# Patient Record
Sex: FEMALE | Race: WHITE | NOT HISPANIC OR LATINO | URBAN - METROPOLITAN AREA
[De-identification: names, ages, dates, MRNs, and addresses within clinical notes are randomized per-mention and may not be internally consistent; named-entity substitution may affect disease eponyms.]

---

## 2021-05-05 ENCOUNTER — APPOINTMENT (OUTPATIENT)
Dept: RADIOLOGY | Facility: MEDICAL CENTER | Age: 56
DRG: 004 | End: 2021-05-05
Attending: EMERGENCY MEDICINE
Payer: OTHER MISCELLANEOUS

## 2021-05-05 ENCOUNTER — APPOINTMENT (OUTPATIENT)
Dept: RADIOLOGY | Facility: MEDICAL CENTER | Age: 56
DRG: 004 | End: 2021-05-05
Attending: SURGERY
Payer: OTHER MISCELLANEOUS

## 2021-05-05 ENCOUNTER — HOSPITAL ENCOUNTER (INPATIENT)
Facility: MEDICAL CENTER | Age: 56
LOS: 7 days | DRG: 004 | End: 2021-05-12
Attending: EMERGENCY MEDICINE | Admitting: SURGERY
Payer: OTHER MISCELLANEOUS

## 2021-05-05 PROBLEM — S22.41XA CLOSED FRACTURE OF TWO RIBS OF RIGHT SIDE: Status: ACTIVE | Noted: 2021-05-05

## 2021-05-05 PROBLEM — Z11.9 SCREENING EXAMINATION FOR INFECTIOUS DISEASE: Status: ACTIVE | Noted: 2021-05-05

## 2021-05-05 PROBLEM — T50.905A PLATELET DYSFUNCTION DUE TO DRUGS: Status: ACTIVE | Noted: 2021-05-05

## 2021-05-05 PROBLEM — S02.92XA EXTENSIVE FACIAL FRACTURES (HCC): Status: ACTIVE | Noted: 2021-05-05

## 2021-05-05 PROBLEM — Z53.09 CONTRAINDICATION TO DEEP VEIN THROMBOSIS (DVT) PROPHYLAXIS: Status: ACTIVE | Noted: 2021-05-05

## 2021-05-05 PROBLEM — S06.36AA TRAUMATIC HEMORRHAGE OF CEREBRUM (HCC): Status: ACTIVE | Noted: 2021-05-05

## 2021-05-05 PROBLEM — T14.90XA TRAUMA: Status: ACTIVE | Noted: 2021-05-05

## 2021-05-05 PROBLEM — J96.90 RESPIRATORY FAILURE AFTER TRAUMA (HCC): Status: ACTIVE | Noted: 2021-05-05

## 2021-05-05 PROBLEM — D69.59 PLATELET DYSFUNCTION DUE TO DRUGS: Status: ACTIVE | Noted: 2021-05-05

## 2021-05-05 PROBLEM — S02.119A FRACTURE OF OCCIPITAL BONE (HCC): Status: ACTIVE | Noted: 2021-05-05

## 2021-05-05 LAB
ABO + RH BLD: NORMAL
ABO GROUP BLD: NORMAL
ALBUMIN SERPL BCP-MCNC: 2.7 G/DL (ref 3.2–4.9)
ALBUMIN/GLOB SERPL: 1.6 G/DL
ALP SERPL-CCNC: 47 U/L (ref 30–99)
ALT SERPL-CCNC: 32 U/L (ref 2–50)
ANION GAP SERPL CALC-SCNC: 8 MMOL/L (ref 7–16)
APTT PPP: 23.8 SEC (ref 24.7–36)
AST SERPL-CCNC: 44 U/L (ref 12–45)
BARCODED ABORH UBTYP: 6200
BARCODED PRD CODE UBPRD: NORMAL
BARCODED UNIT NUM UBUNT: NORMAL
BASE EXCESS BLDA CALC-SCNC: 0 MMOL/L (ref -4–3)
BILIRUB SERPL-MCNC: 0.2 MG/DL (ref 0.1–1.5)
BLD GP AB SCN SERPL QL: NORMAL
BODY TEMPERATURE: ABNORMAL DEGREES
BREATHS SETTING VENT: 24
BUN SERPL-MCNC: 7 MG/DL (ref 8–22)
CALCIUM SERPL-MCNC: 5.6 MG/DL (ref 8.5–10.5)
CFT BLD TEG: 3.2 MIN (ref 5–10)
CHLORIDE SERPL-SCNC: 118 MMOL/L (ref 96–112)
CLOT ANGLE BLD TEG: 67.6 DEGREES (ref 53–72)
CLOT LYSIS 30M P MA LENFR BLD TEG: 0 % (ref 0–8)
CO2 BLDA-SCNC: 27 MMOL/L (ref 20–33)
CO2 SERPL-SCNC: 17 MMOL/L (ref 20–33)
COMPONENT P 8504P: NORMAL
CREAT SERPL-MCNC: 0.35 MG/DL (ref 0.5–1.4)
CT.EXTRINSIC BLD ROTEM: 2.1 MIN (ref 1–3)
DELSYS IDSYS: ABNORMAL
END TIDAL CARBON DIOXIDE IECO2: 35 MMHG
ERYTHROCYTE [DISTWIDTH] IN BLOOD BY AUTOMATED COUNT: 43.2 FL (ref 35.9–50)
ETHANOL BLD-MCNC: <10.1 MG/DL (ref 0–10)
GLOBULIN SER CALC-MCNC: 1.7 G/DL (ref 1.9–3.5)
GLUCOSE SERPL-MCNC: 120 MG/DL (ref 65–99)
HCG SERPL QL: NEGATIVE
HCO3 BLDA-SCNC: 25.4 MMOL/L (ref 17–25)
HCT VFR BLD AUTO: 39.1 % (ref 37–47)
HGB BLD-MCNC: 13.3 G/DL (ref 12–16)
HOROWITZ INDEX BLDA+IHG-RTO: 450 MM[HG]
INR PPP: 1.08 (ref 0.87–1.13)
LACTATE BLD-SCNC: 2.6 MMOL/L (ref 0.5–2)
MCF BLD TEG: 59.2 MM (ref 50–70)
MCH RBC QN AUTO: 32.3 PG (ref 27–33)
MCHC RBC AUTO-ENTMCNC: 34 G/DL (ref 33.6–35)
MCV RBC AUTO: 94.9 FL (ref 81.4–97.8)
MODE IMODE: ABNORMAL
O2/TOTAL GAS SETTING VFR VENT: 100 %
PA AA BLD-ACNC: 90.2 %
PA ADP BLD-ACNC: 88.4 %
PCO2 BLDA: 43.1 MMHG (ref 26–37)
PCO2 TEMP ADJ BLDA: 41 MMHG (ref 26–37)
PEEP END EXPIRATORY PRESSURE IPEEP: 8 CMH20
PH BLDA: 7.38 [PH] (ref 7.4–7.5)
PH TEMP ADJ BLDA: 7.39 [PH] (ref 7.4–7.5)
PLATELET # BLD AUTO: 181 K/UL (ref 164–446)
PMV BLD AUTO: 9.8 FL (ref 9–12.9)
PO2 BLDA: 450 MMHG (ref 64–87)
PO2 TEMP ADJ BLDA: 443 MMHG (ref 64–87)
POTASSIUM SERPL-SCNC: 2.5 MMOL/L (ref 3.6–5.5)
PRODUCT TYPE UPROD: NORMAL
PROT SERPL-MCNC: 4.4 G/DL (ref 6–8.2)
PROTHROMBIN TIME: 14.4 SEC (ref 12–14.6)
RBC # BLD AUTO: 4.12 M/UL (ref 4.2–5.4)
RH BLD: NORMAL
SAO2 % BLDA: 100 % (ref 93–99)
SARS-COV+SARS-COV-2 AG RESP QL IA.RAPID: NOTDETECTED
SODIUM SERPL-SCNC: 143 MMOL/L (ref 135–145)
SPECIMEN DRAWN FROM PATIENT: ABNORMAL
SPECIMEN SOURCE: NORMAL
TEG ALGORITHM TGALG: ABNORMAL
TIDAL VOLUME IVT: 350 ML
TRIGL SERPL-MCNC: 65 MG/DL (ref 0–149)
UNIT STATUS USTAT: NORMAL
WBC # BLD AUTO: 12.8 K/UL (ref 4.8–10.8)

## 2021-05-05 PROCEDURE — C1751 CATH, INF, PER/CENT/MIDLINE: HCPCS

## 2021-05-05 PROCEDURE — 700111 HCHG RX REV CODE 636 W/ 250 OVERRIDE (IP): Performed by: EMERGENCY MEDICINE

## 2021-05-05 PROCEDURE — 85347 COAGULATION TIME ACTIVATED: CPT

## 2021-05-05 PROCEDURE — 94799 UNLISTED PULMONARY SVC/PX: CPT

## 2021-05-05 PROCEDURE — 70450 CT HEAD/BRAIN W/O DYE: CPT

## 2021-05-05 PROCEDURE — 99153 MOD SED SAME PHYS/QHP EA: CPT

## 2021-05-05 PROCEDURE — 02HV33Z INSERTION OF INFUSION DEVICE INTO SUPERIOR VENA CAVA, PERCUTANEOUS APPROACH: ICD-10-PCS | Performed by: SURGERY

## 2021-05-05 PROCEDURE — 72128 CT CHEST SPINE W/O DYE: CPT

## 2021-05-05 PROCEDURE — 5A1955Z RESPIRATORY VENTILATION, GREATER THAN 96 CONSECUTIVE HOURS: ICD-10-PCS | Performed by: EMERGENCY MEDICINE

## 2021-05-05 PROCEDURE — 85027 COMPLETE CBC AUTOMATED: CPT

## 2021-05-05 PROCEDURE — 36556 INSERT NON-TUNNEL CV CATH: CPT

## 2021-05-05 PROCEDURE — 85576 BLOOD PLATELET AGGREGATION: CPT

## 2021-05-05 PROCEDURE — 83605 ASSAY OF LACTIC ACID: CPT

## 2021-05-05 PROCEDURE — 85384 FIBRINOGEN ACTIVITY: CPT

## 2021-05-05 PROCEDURE — 37799 UNLISTED PX VASCULAR SURGERY: CPT

## 2021-05-05 PROCEDURE — 71260 CT THORAX DX C+: CPT

## 2021-05-05 PROCEDURE — 82077 ASSAY SPEC XCP UR&BREATH IA: CPT

## 2021-05-05 PROCEDURE — 36620 INSERTION CATHETER ARTERY: CPT

## 2021-05-05 PROCEDURE — G0390 TRAUMA RESPONS W/HOSP CRITI: HCPCS

## 2021-05-05 PROCEDURE — 72125 CT NECK SPINE W/O DYE: CPT

## 2021-05-05 PROCEDURE — 80053 COMPREHEN METABOLIC PANEL: CPT

## 2021-05-05 PROCEDURE — 86900 BLOOD TYPING SEROLOGIC ABO: CPT

## 2021-05-05 PROCEDURE — 700117 HCHG RX CONTRAST REV CODE 255: Performed by: EMERGENCY MEDICINE

## 2021-05-05 PROCEDURE — 94002 VENT MGMT INPAT INIT DAY: CPT

## 2021-05-05 PROCEDURE — 86850 RBC ANTIBODY SCREEN: CPT

## 2021-05-05 PROCEDURE — 85610 PROTHROMBIN TIME: CPT

## 2021-05-05 PROCEDURE — 84703 CHORIONIC GONADOTROPIN ASSAY: CPT

## 2021-05-05 PROCEDURE — 70486 CT MAXILLOFACIAL W/O DYE: CPT

## 2021-05-05 PROCEDURE — P9034 PLATELETS, PHERESIS: HCPCS

## 2021-05-05 PROCEDURE — 82803 BLOOD GASES ANY COMBINATION: CPT

## 2021-05-05 PROCEDURE — 700111 HCHG RX REV CODE 636 W/ 250 OVERRIDE (IP): Performed by: NEUROLOGICAL SURGERY

## 2021-05-05 PROCEDURE — 30233R1 TRANSFUSION OF NONAUTOLOGOUS PLATELETS INTO PERIPHERAL VEIN, PERCUTANEOUS APPROACH: ICD-10-PCS | Performed by: EMERGENCY MEDICINE

## 2021-05-05 PROCEDURE — 85730 THROMBOPLASTIN TIME PARTIAL: CPT

## 2021-05-05 PROCEDURE — U0003 INFECTIOUS AGENT DETECTION BY NUCLEIC ACID (DNA OR RNA); SEVERE ACUTE RESPIRATORY SYNDROME CORONAVIRUS 2 (SARS-COV-2) (CORONAVIRUS DISEASE [COVID-19]), AMPLIFIED PROBE TECHNIQUE, MAKING USE OF HIGH THROUGHPUT TECHNOLOGIES AS DESCRIBED BY CMS-2020-01-R: HCPCS

## 2021-05-05 PROCEDURE — 700111 HCHG RX REV CODE 636 W/ 250 OVERRIDE (IP): Performed by: SURGERY

## 2021-05-05 PROCEDURE — 96374 THER/PROPH/DIAG INJ IV PUSH: CPT

## 2021-05-05 PROCEDURE — 87426 SARSCOV CORONAVIRUS AG IA: CPT

## 2021-05-05 PROCEDURE — 71045 X-RAY EXAM CHEST 1 VIEW: CPT

## 2021-05-05 PROCEDURE — 36430 TRANSFUSION BLD/BLD COMPNT: CPT

## 2021-05-05 PROCEDURE — 4A103BD MONITORING OF INTRACRANIAL PRESSURE, PERCUTANEOUS APPROACH: ICD-10-PCS | Performed by: NEUROLOGICAL SURGERY

## 2021-05-05 PROCEDURE — 4A133B1 MONITORING OF ARTERIAL PRESSURE, PERIPHERAL, PERCUTANEOUS APPROACH: ICD-10-PCS | Performed by: SURGERY

## 2021-05-05 PROCEDURE — 72170 X-RAY EXAM OF PELVIS: CPT

## 2021-05-05 PROCEDURE — 84478 ASSAY OF TRIGLYCERIDES: CPT

## 2021-05-05 PROCEDURE — 700105 HCHG RX REV CODE 258: Performed by: SURGERY

## 2021-05-05 PROCEDURE — 86901 BLOOD TYPING SEROLOGIC RH(D): CPT

## 2021-05-05 PROCEDURE — U0005 INFEC AGEN DETEC AMPLI PROBE: HCPCS

## 2021-05-05 PROCEDURE — 0HQ1XZZ REPAIR FACE SKIN, EXTERNAL APPROACH: ICD-10-PCS | Performed by: SURGERY

## 2021-05-05 PROCEDURE — 99291 CRITICAL CARE FIRST HOUR: CPT

## 2021-05-05 PROCEDURE — 99152 MOD SED SAME PHYS/QHP 5/>YRS: CPT

## 2021-05-05 PROCEDURE — 770022 HCHG ROOM/CARE - ICU (200)

## 2021-05-05 PROCEDURE — 72131 CT LUMBAR SPINE W/O DYE: CPT

## 2021-05-05 RX ORDER — OXYCODONE HYDROCHLORIDE 5 MG/1
5 TABLET ORAL
Status: DISCONTINUED | OUTPATIENT
Start: 2021-05-05 | End: 2021-05-06

## 2021-05-05 RX ORDER — LEVETIRACETAM 5 MG/ML
500 INJECTION INTRAVASCULAR 2 TIMES DAILY
Status: DISCONTINUED | OUTPATIENT
Start: 2021-05-05 | End: 2021-05-06

## 2021-05-05 RX ORDER — OXYCODONE HYDROCHLORIDE 5 MG/1
10 TABLET ORAL
Status: DISCONTINUED | OUTPATIENT
Start: 2021-05-05 | End: 2021-05-06

## 2021-05-05 RX ORDER — POLYETHYLENE GLYCOL 3350 17 G/17G
1 POWDER, FOR SOLUTION ORAL 2 TIMES DAILY
Status: DISCONTINUED | OUTPATIENT
Start: 2021-05-06 | End: 2021-05-06

## 2021-05-05 RX ORDER — PROPOFOL 10 MG/ML
40 INJECTION, EMULSION INTRAVENOUS ONCE
Status: COMPLETED | OUTPATIENT
Start: 2021-05-05 | End: 2021-05-05

## 2021-05-05 RX ORDER — BISACODYL 10 MG
10 SUPPOSITORY, RECTAL RECTAL
Status: DISCONTINUED | OUTPATIENT
Start: 2021-05-05 | End: 2021-05-12 | Stop reason: HOSPADM

## 2021-05-05 RX ORDER — POTASSIUM CHLORIDE 29.8 MG/ML
40 INJECTION INTRAVENOUS ONCE
Status: COMPLETED | OUTPATIENT
Start: 2021-05-05 | End: 2021-05-06

## 2021-05-05 RX ORDER — HYDROMORPHONE HYDROCHLORIDE 1 MG/ML
0.5 INJECTION, SOLUTION INTRAMUSCULAR; INTRAVENOUS; SUBCUTANEOUS
Status: DISCONTINUED | OUTPATIENT
Start: 2021-05-05 | End: 2021-05-06

## 2021-05-05 RX ORDER — CEFAZOLIN SODIUM 1 G/50ML
1 INJECTION, SOLUTION INTRAVENOUS ONCE
Status: COMPLETED | OUTPATIENT
Start: 2021-05-05 | End: 2021-05-05

## 2021-05-05 RX ORDER — ACETAMINOPHEN 325 MG/1
650 TABLET ORAL EVERY 6 HOURS
Status: DISCONTINUED | OUTPATIENT
Start: 2021-05-05 | End: 2021-05-06

## 2021-05-05 RX ORDER — CEFAZOLIN SODIUM 1 G/50ML
1 INJECTION, SOLUTION INTRAVENOUS EVERY 8 HOURS
Status: DISCONTINUED | OUTPATIENT
Start: 2021-05-06 | End: 2021-05-08

## 2021-05-05 RX ORDER — DOCUSATE SODIUM 100 MG/1
100 CAPSULE, LIQUID FILLED ORAL 2 TIMES DAILY
Status: DISCONTINUED | OUTPATIENT
Start: 2021-05-06 | End: 2021-05-06

## 2021-05-05 RX ORDER — ONDANSETRON 2 MG/ML
4 INJECTION INTRAMUSCULAR; INTRAVENOUS EVERY 4 HOURS PRN
Status: DISCONTINUED | OUTPATIENT
Start: 2021-05-05 | End: 2021-05-12 | Stop reason: HOSPADM

## 2021-05-05 RX ORDER — FAMOTIDINE 20 MG/1
20 TABLET, FILM COATED ORAL 2 TIMES DAILY
Status: DISCONTINUED | OUTPATIENT
Start: 2021-05-06 | End: 2021-05-06

## 2021-05-05 RX ORDER — ENEMA 19; 7 G/133ML; G/133ML
1 ENEMA RECTAL
Status: DISCONTINUED | OUTPATIENT
Start: 2021-05-05 | End: 2021-05-12 | Stop reason: HOSPADM

## 2021-05-05 RX ORDER — LABETALOL HYDROCHLORIDE 5 MG/ML
10 INJECTION, SOLUTION INTRAVENOUS EVERY 4 HOURS PRN
Status: DISCONTINUED | OUTPATIENT
Start: 2021-05-05 | End: 2021-05-12 | Stop reason: HOSPADM

## 2021-05-05 RX ORDER — AMOXICILLIN 250 MG
1 CAPSULE ORAL
Status: DISCONTINUED | OUTPATIENT
Start: 2021-05-05 | End: 2021-05-06

## 2021-05-05 RX ORDER — ACETAMINOPHEN 325 MG/1
650 TABLET ORAL EVERY 6 HOURS PRN
Status: DISCONTINUED | OUTPATIENT
Start: 2021-05-10 | End: 2021-05-06

## 2021-05-05 RX ORDER — SODIUM CHLORIDE 9 MG/ML
INJECTION, SOLUTION INTRAVENOUS CONTINUOUS
Status: DISCONTINUED | OUTPATIENT
Start: 2021-05-05 | End: 2021-05-06

## 2021-05-05 RX ORDER — FAMOTIDINE 20 MG/1
20 TABLET, FILM COATED ORAL 2 TIMES DAILY
Status: DISCONTINUED | OUTPATIENT
Start: 2021-05-05 | End: 2021-05-05

## 2021-05-05 RX ORDER — HYDRALAZINE HYDROCHLORIDE 20 MG/ML
20 INJECTION INTRAMUSCULAR; INTRAVENOUS EVERY 4 HOURS PRN
Status: DISCONTINUED | OUTPATIENT
Start: 2021-05-05 | End: 2021-05-06

## 2021-05-05 RX ORDER — AMOXICILLIN 250 MG
1 CAPSULE ORAL NIGHTLY
Status: DISCONTINUED | OUTPATIENT
Start: 2021-05-05 | End: 2021-05-06

## 2021-05-05 RX ORDER — SODIUM CHLORIDE 9 MG/ML
1000 INJECTION, SOLUTION INTRAVENOUS ONCE
Status: COMPLETED | OUTPATIENT
Start: 2021-05-05 | End: 2021-05-06

## 2021-05-05 RX ADMIN — HYDROMORPHONE HYDROCHLORIDE 0.5 MG: 1 INJECTION, SOLUTION INTRAMUSCULAR; INTRAVENOUS; SUBCUTANEOUS at 21:16

## 2021-05-05 RX ADMIN — IOHEXOL 100 ML: 350 INJECTION, SOLUTION INTRAVENOUS at 21:00

## 2021-05-05 RX ADMIN — SODIUM CHLORIDE 1000 ML: 9 INJECTION, SOLUTION INTRAVENOUS at 23:00

## 2021-05-05 RX ADMIN — HYDROMORPHONE HYDROCHLORIDE 0.5 MG: 1 INJECTION, SOLUTION INTRAMUSCULAR; INTRAVENOUS; SUBCUTANEOUS at 23:05

## 2021-05-05 RX ADMIN — PROPOFOL 80 MCG/KG/MIN: 10 INJECTION, EMULSION INTRAVENOUS at 20:50

## 2021-05-05 RX ADMIN — PROPOFOL 40 MG: 10 INJECTION, EMULSION INTRAVENOUS at 21:45

## 2021-05-05 RX ADMIN — CEFAZOLIN SODIUM 1 G: 1 INJECTION, SOLUTION INTRAVENOUS at 21:42

## 2021-05-05 RX ADMIN — SODIUM CHLORIDE: 9 INJECTION, SOLUTION INTRAVENOUS at 20:51

## 2021-05-05 RX ADMIN — PROPOFOL 40 MCG/KG/MIN: 10 INJECTION, EMULSION INTRAVENOUS at 20:30

## 2021-05-05 RX ADMIN — POTASSIUM CHLORIDE 40 MEQ: 29.8 INJECTION, SOLUTION INTRAVENOUS at 22:49

## 2021-05-05 ASSESSMENT — PAIN DESCRIPTION - PAIN TYPE
TYPE: ACUTE PAIN

## 2021-05-05 ASSESSMENT — FIBROSIS 4 INDEX: FIB4 SCORE: 5.2

## 2021-05-06 ENCOUNTER — APPOINTMENT (OUTPATIENT)
Dept: RADIOLOGY | Facility: MEDICAL CENTER | Age: 56
DRG: 004 | End: 2021-05-06
Attending: SURGERY
Payer: OTHER MISCELLANEOUS

## 2021-05-06 ENCOUNTER — HOSPITAL ENCOUNTER (OUTPATIENT)
Dept: RADIOLOGY | Facility: MEDICAL CENTER | Age: 56
End: 2021-05-06
Attending: NEUROLOGICAL SURGERY

## 2021-05-06 ENCOUNTER — APPOINTMENT (OUTPATIENT)
Dept: RADIOLOGY | Facility: MEDICAL CENTER | Age: 56
DRG: 004 | End: 2021-05-06
Attending: NURSE PRACTITIONER
Payer: OTHER MISCELLANEOUS

## 2021-05-06 PROBLEM — S42.031A: Status: ACTIVE | Noted: 2021-05-06

## 2021-05-06 PROBLEM — S01.81XA FACIAL LACERATION: Status: ACTIVE | Noted: 2021-05-06

## 2021-05-06 LAB
ALBUMIN SERPL BCP-MCNC: 2.8 G/DL (ref 3.2–4.9)
ALBUMIN/GLOB SERPL: 1.5 G/DL
ALP SERPL-CCNC: 54 U/L (ref 30–99)
ALT SERPL-CCNC: 35 U/L (ref 2–50)
ANION GAP SERPL CALC-SCNC: 10 MMOL/L (ref 7–16)
AST SERPL-CCNC: 44 U/L (ref 12–45)
BASE EXCESS BLDA CALC-SCNC: -7 MMOL/L (ref -4–3)
BASOPHILS # BLD AUTO: 0.2 % (ref 0–1.8)
BASOPHILS # BLD: 0.02 K/UL (ref 0–0.12)
BILIRUB SERPL-MCNC: 0.5 MG/DL (ref 0.1–1.5)
BODY TEMPERATURE: ABNORMAL DEGREES
BREATHS SETTING VENT: 24
BUN SERPL-MCNC: 8 MG/DL (ref 8–22)
CALCIUM SERPL-MCNC: 6.6 MG/DL (ref 8.5–10.5)
CFT BLD TEG: 3.5 MIN (ref 5–10)
CHLORIDE SERPL-SCNC: 114 MMOL/L (ref 96–112)
CLOT ANGLE BLD TEG: 65.1 DEGREES (ref 53–72)
CLOT LYSIS 30M P MA LENFR BLD TEG: 0 % (ref 0–8)
CO2 BLDA-SCNC: 19 MMOL/L (ref 20–33)
CO2 SERPL-SCNC: 17 MMOL/L (ref 20–33)
CREAT SERPL-MCNC: 0.49 MG/DL (ref 0.5–1.4)
CT.EXTRINSIC BLD ROTEM: 2.2 MIN (ref 1–3)
DELSYS IDSYS: ABNORMAL
END TIDAL CARBON DIOXIDE IECO2: 34 MMHG
EOSINOPHIL # BLD AUTO: 0 K/UL (ref 0–0.51)
EOSINOPHIL NFR BLD: 0 % (ref 0–6.9)
ERYTHROCYTE [DISTWIDTH] IN BLOOD BY AUTOMATED COUNT: 43.8 FL (ref 35.9–50)
GLOBULIN SER CALC-MCNC: 1.9 G/DL (ref 1.9–3.5)
GLUCOSE SERPL-MCNC: 160 MG/DL (ref 65–99)
HCO3 BLDA-SCNC: 18 MMOL/L (ref 17–25)
HCT VFR BLD AUTO: 31.6 % (ref 37–47)
HGB BLD-MCNC: 10.8 G/DL (ref 12–16)
HOROWITZ INDEX BLDA+IHG-RTO: 420 MM[HG]
IMM GRANULOCYTES # BLD AUTO: 0.04 K/UL (ref 0–0.11)
IMM GRANULOCYTES NFR BLD AUTO: 0.3 % (ref 0–0.9)
LYMPHOCYTES # BLD AUTO: 0.51 K/UL (ref 1–4.8)
LYMPHOCYTES NFR BLD: 4.2 % (ref 22–41)
MAGNESIUM SERPL-MCNC: 1.4 MG/DL (ref 1.5–2.5)
MCF BLD TEG: 60.9 MM (ref 50–70)
MCH RBC QN AUTO: 32.6 PG (ref 27–33)
MCHC RBC AUTO-ENTMCNC: 34.2 G/DL (ref 33.6–35)
MCV RBC AUTO: 95.5 FL (ref 81.4–97.8)
MODE IMODE: ABNORMAL
MONOCYTES # BLD AUTO: 0.57 K/UL (ref 0–0.85)
MONOCYTES NFR BLD AUTO: 4.7 % (ref 0–13.4)
NEUTROPHILS # BLD AUTO: 11.1 K/UL (ref 2–7.15)
NEUTROPHILS NFR BLD: 90.6 % (ref 44–72)
NRBC # BLD AUTO: 0 K/UL
NRBC BLD-RTO: 0 /100 WBC
O2/TOTAL GAS SETTING VFR VENT: 30 %
PA AA BLD-ACNC: 0 %
PA ADP BLD-ACNC: 13.3 %
PCO2 BLDA: 31.5 MMHG (ref 26–37)
PCO2 TEMP ADJ BLDA: 32.2 MMHG (ref 26–37)
PEEP END EXPIRATORY PRESSURE IPEEP: 8 CMH20
PH BLDA: 7.37 [PH] (ref 7.4–7.5)
PH TEMP ADJ BLDA: 7.36 [PH] (ref 7.4–7.5)
PHOSPHATE SERPL-MCNC: 2.6 MG/DL (ref 2.5–4.5)
PLATELET # BLD AUTO: 201 K/UL (ref 164–446)
PMV BLD AUTO: 9.1 FL (ref 9–12.9)
PO2 BLDA: 126 MMHG (ref 64–87)
PO2 TEMP ADJ BLDA: 130 MMHG (ref 64–87)
POTASSIUM SERPL-SCNC: 3.1 MMOL/L (ref 3.6–5.5)
PROT SERPL-MCNC: 4.7 G/DL (ref 6–8.2)
RBC # BLD AUTO: 3.31 M/UL (ref 4.2–5.4)
SAO2 % BLDA: 99 % (ref 93–99)
SARS-COV-2 RNA RESP QL NAA+PROBE: NOTDETECTED
SODIUM SERPL-SCNC: 141 MMOL/L (ref 135–145)
SPECIMEN DRAWN FROM PATIENT: ABNORMAL
SPECIMEN SOURCE: NORMAL
TEG ALGORITHM TGALG: ABNORMAL
TIDAL VOLUME IVT: 350 ML
WBC # BLD AUTO: 12.2 K/UL (ref 4.8–10.8)

## 2021-05-06 PROCEDURE — 71045 X-RAY EXAM CHEST 1 VIEW: CPT

## 2021-05-06 PROCEDURE — 700105 HCHG RX REV CODE 258: Performed by: SURGERY

## 2021-05-06 PROCEDURE — 83735 ASSAY OF MAGNESIUM: CPT

## 2021-05-06 PROCEDURE — 700101 HCHG RX REV CODE 250: Performed by: NURSE PRACTITIONER

## 2021-05-06 PROCEDURE — 700111 HCHG RX REV CODE 636 W/ 250 OVERRIDE (IP): Performed by: NURSE PRACTITIONER

## 2021-05-06 PROCEDURE — 700111 HCHG RX REV CODE 636 W/ 250 OVERRIDE (IP): Performed by: SURGERY

## 2021-05-06 PROCEDURE — 99292 CRITICAL CARE ADDL 30 MIN: CPT | Performed by: SURGERY

## 2021-05-06 PROCEDURE — 85025 COMPLETE CBC W/AUTO DIFF WBC: CPT

## 2021-05-06 PROCEDURE — 37799 UNLISTED PX VASCULAR SURGERY: CPT

## 2021-05-06 PROCEDURE — 700101 HCHG RX REV CODE 250: Performed by: SURGERY

## 2021-05-06 PROCEDURE — 82803 BLOOD GASES ANY COMBINATION: CPT

## 2021-05-06 PROCEDURE — 85576 BLOOD PLATELET AGGREGATION: CPT

## 2021-05-06 PROCEDURE — 94003 VENT MGMT INPAT SUBQ DAY: CPT

## 2021-05-06 PROCEDURE — 94799 UNLISTED PULMONARY SVC/PX: CPT

## 2021-05-06 PROCEDURE — 93970 EXTREMITY STUDY: CPT

## 2021-05-06 PROCEDURE — 85384 FIBRINOGEN ACTIVITY: CPT

## 2021-05-06 PROCEDURE — 94760 N-INVAS EAR/PLS OXIMETRY 1: CPT

## 2021-05-06 PROCEDURE — 93970 EXTREMITY STUDY: CPT | Mod: 26 | Performed by: INTERNAL MEDICINE

## 2021-05-06 PROCEDURE — 770022 HCHG ROOM/CARE - ICU (200)

## 2021-05-06 PROCEDURE — A9270 NON-COVERED ITEM OR SERVICE: HCPCS | Performed by: SURGERY

## 2021-05-06 PROCEDURE — 99291 CRITICAL CARE FIRST HOUR: CPT | Performed by: SURGERY

## 2021-05-06 PROCEDURE — 80053 COMPREHEN METABOLIC PANEL: CPT

## 2021-05-06 PROCEDURE — 84100 ASSAY OF PHOSPHORUS: CPT

## 2021-05-06 PROCEDURE — 700111 HCHG RX REV CODE 636 W/ 250 OVERRIDE (IP): Performed by: NEUROLOGICAL SURGERY

## 2021-05-06 PROCEDURE — 700102 HCHG RX REV CODE 250 W/ 637 OVERRIDE(OP): Performed by: SURGERY

## 2021-05-06 PROCEDURE — 70450 CT HEAD/BRAIN W/O DYE: CPT

## 2021-05-06 PROCEDURE — 85347 COAGULATION TIME ACTIVATED: CPT

## 2021-05-06 RX ORDER — HYDRALAZINE HYDROCHLORIDE 20 MG/ML
20 INJECTION INTRAMUSCULAR; INTRAVENOUS EVERY 4 HOURS PRN
Status: DISCONTINUED | OUTPATIENT
Start: 2021-05-06 | End: 2021-05-12 | Stop reason: HOSPADM

## 2021-05-06 RX ORDER — AMOXICILLIN 250 MG
1 CAPSULE ORAL NIGHTLY
Status: DISCONTINUED | OUTPATIENT
Start: 2021-05-06 | End: 2021-05-12 | Stop reason: HOSPADM

## 2021-05-06 RX ORDER — SODIUM CHLORIDE AND POTASSIUM CHLORIDE 300; 900 MG/100ML; MG/100ML
INJECTION, SOLUTION INTRAVENOUS CONTINUOUS
Status: DISCONTINUED | OUTPATIENT
Start: 2021-05-06 | End: 2021-05-08

## 2021-05-06 RX ORDER — DOCUSATE SODIUM 50 MG/5ML
100 LIQUID ORAL 2 TIMES DAILY
Status: DISCONTINUED | OUTPATIENT
Start: 2021-05-06 | End: 2021-05-12 | Stop reason: HOSPADM

## 2021-05-06 RX ORDER — MAGNESIUM SULFATE HEPTAHYDRATE 40 MG/ML
2 INJECTION, SOLUTION INTRAVENOUS ONCE
Status: COMPLETED | OUTPATIENT
Start: 2021-05-06 | End: 2021-05-06

## 2021-05-06 RX ORDER — AMOXICILLIN 250 MG
1 CAPSULE ORAL
Status: DISCONTINUED | OUTPATIENT
Start: 2021-05-06 | End: 2021-05-12 | Stop reason: HOSPADM

## 2021-05-06 RX ORDER — LEVETIRACETAM 500 MG/1
500 TABLET ORAL 2 TIMES DAILY
Status: COMPLETED | OUTPATIENT
Start: 2021-05-06 | End: 2021-05-12

## 2021-05-06 RX ORDER — ENALAPRILAT 1.25 MG/ML
1.25 INJECTION INTRAVENOUS EVERY 6 HOURS PRN
Status: DISCONTINUED | OUTPATIENT
Start: 2021-05-06 | End: 2021-05-12 | Stop reason: HOSPADM

## 2021-05-06 RX ORDER — OXYCODONE HYDROCHLORIDE 5 MG/1
5 TABLET ORAL
Status: DISCONTINUED | OUTPATIENT
Start: 2021-05-06 | End: 2021-05-10

## 2021-05-06 RX ORDER — FAMOTIDINE 20 MG/1
20 TABLET, FILM COATED ORAL 2 TIMES DAILY
Status: DISCONTINUED | OUTPATIENT
Start: 2021-05-06 | End: 2021-05-12 | Stop reason: HOSPADM

## 2021-05-06 RX ORDER — AMLODIPINE BESYLATE 10 MG/1
10 TABLET ORAL
Status: DISCONTINUED | OUTPATIENT
Start: 2021-05-06 | End: 2021-05-12 | Stop reason: HOSPADM

## 2021-05-06 RX ORDER — ACETAMINOPHEN 325 MG/1
650 TABLET ORAL EVERY 6 HOURS PRN
Status: DISCONTINUED | OUTPATIENT
Start: 2021-05-10 | End: 2021-05-10

## 2021-05-06 RX ORDER — HYDROMORPHONE HYDROCHLORIDE 1 MG/ML
0.5 INJECTION, SOLUTION INTRAMUSCULAR; INTRAVENOUS; SUBCUTANEOUS
Status: DISCONTINUED | OUTPATIENT
Start: 2021-05-06 | End: 2021-05-10

## 2021-05-06 RX ORDER — CALCIUM GLUCONATE 94 MG/ML
1 INJECTION, SOLUTION INTRAVENOUS ONCE
Status: DISCONTINUED | OUTPATIENT
Start: 2021-05-06 | End: 2021-05-06

## 2021-05-06 RX ORDER — OXYCODONE HYDROCHLORIDE 5 MG/1
10 TABLET ORAL
Status: DISCONTINUED | OUTPATIENT
Start: 2021-05-06 | End: 2021-05-10

## 2021-05-06 RX ORDER — ACETAMINOPHEN 325 MG/1
650 TABLET ORAL EVERY 6 HOURS
Status: COMPLETED | OUTPATIENT
Start: 2021-05-06 | End: 2021-05-10

## 2021-05-06 RX ORDER — POLYETHYLENE GLYCOL 3350 17 G/17G
1 POWDER, FOR SOLUTION ORAL 2 TIMES DAILY
Status: DISCONTINUED | OUTPATIENT
Start: 2021-05-06 | End: 2021-05-12 | Stop reason: HOSPADM

## 2021-05-06 RX ADMIN — HYDROMORPHONE HYDROCHLORIDE 0.5 MG: 1 INJECTION, SOLUTION INTRAMUSCULAR; INTRAVENOUS; SUBCUTANEOUS at 07:27

## 2021-05-06 RX ADMIN — LABETALOL HYDROCHLORIDE 10 MG: 5 INJECTION, SOLUTION INTRAVENOUS at 05:46

## 2021-05-06 RX ADMIN — CEFAZOLIN SODIUM 1 G: 1 INJECTION, SOLUTION INTRAVENOUS at 20:28

## 2021-05-06 RX ADMIN — HYDROMORPHONE HYDROCHLORIDE 0.5 MG: 1 INJECTION, SOLUTION INTRAMUSCULAR; INTRAVENOUS; SUBCUTANEOUS at 14:26

## 2021-05-06 RX ADMIN — OXYCODONE 10 MG: 5 TABLET ORAL at 13:32

## 2021-05-06 RX ADMIN — HYDROMORPHONE HYDROCHLORIDE 0.5 MG: 1 INJECTION, SOLUTION INTRAMUSCULAR; INTRAVENOUS; SUBCUTANEOUS at 10:07

## 2021-05-06 RX ADMIN — HYDROMORPHONE HYDROCHLORIDE 0.5 MG: 1 INJECTION, SOLUTION INTRAMUSCULAR; INTRAVENOUS; SUBCUTANEOUS at 17:11

## 2021-05-06 RX ADMIN — ACETAMINOPHEN 650 MG: 325 TABLET, FILM COATED ORAL at 11:38

## 2021-05-06 RX ADMIN — HYDRALAZINE HYDROCHLORIDE 20 MG: 20 INJECTION INTRAMUSCULAR; INTRAVENOUS at 04:13

## 2021-05-06 RX ADMIN — POTASSIUM CHLORIDE AND SODIUM CHLORIDE: 900; 300 INJECTION, SOLUTION INTRAVENOUS at 07:31

## 2021-05-06 RX ADMIN — POTASSIUM CHLORIDE AND SODIUM CHLORIDE: 900; 300 INJECTION, SOLUTION INTRAVENOUS at 20:55

## 2021-05-06 RX ADMIN — LEVETIRACETAM INJECTION 500 MG: 5 INJECTION INTRAVENOUS at 00:11

## 2021-05-06 RX ADMIN — CALCIUM GLUCONATE 1 G: 98 INJECTION, SOLUTION INTRAVENOUS at 08:01

## 2021-05-06 RX ADMIN — LEVETIRACETAM INJECTION 500 MG: 5 INJECTION INTRAVENOUS at 05:35

## 2021-05-06 RX ADMIN — DOCUSATE SODIUM 100 MG: 50 LIQUID ORAL at 17:10

## 2021-05-06 RX ADMIN — HYDROMORPHONE HYDROCHLORIDE 0.5 MG: 1 INJECTION, SOLUTION INTRAMUSCULAR; INTRAVENOUS; SUBCUTANEOUS at 22:33

## 2021-05-06 RX ADMIN — CEFAZOLIN SODIUM 1 G: 1 INJECTION, SOLUTION INTRAVENOUS at 13:42

## 2021-05-06 RX ADMIN — SODIUM CHLORIDE: 9 INJECTION, SOLUTION INTRAVENOUS at 03:39

## 2021-05-06 RX ADMIN — HYDROMORPHONE HYDROCHLORIDE 0.5 MG: 1 INJECTION, SOLUTION INTRAMUSCULAR; INTRAVENOUS; SUBCUTANEOUS at 04:21

## 2021-05-06 RX ADMIN — AMLODIPINE BESYLATE 10 MG: 10 TABLET ORAL at 13:47

## 2021-05-06 RX ADMIN — HYDROMORPHONE HYDROCHLORIDE 0.5 MG: 1 INJECTION, SOLUTION INTRAMUSCULAR; INTRAVENOUS; SUBCUTANEOUS at 19:22

## 2021-05-06 RX ADMIN — ACETAMINOPHEN 650 MG: 325 TABLET, FILM COATED ORAL at 17:10

## 2021-05-06 RX ADMIN — LEVETIRACETAM 500 MG: 500 TABLET ORAL at 17:11

## 2021-05-06 RX ADMIN — FAMOTIDINE 20 MG: 20 TABLET ORAL at 17:10

## 2021-05-06 RX ADMIN — HYDROMORPHONE HYDROCHLORIDE 0.5 MG: 1 INJECTION, SOLUTION INTRAMUSCULAR; INTRAVENOUS; SUBCUTANEOUS at 11:57

## 2021-05-06 RX ADMIN — FAMOTIDINE 20 MG: 10 INJECTION INTRAVENOUS at 05:34

## 2021-05-06 RX ADMIN — CALCIUM GLUCONATE 1 G: 98 INJECTION, SOLUTION INTRAVENOUS at 02:44

## 2021-05-06 RX ADMIN — HYDROMORPHONE HYDROCHLORIDE 0.5 MG: 1 INJECTION, SOLUTION INTRAMUSCULAR; INTRAVENOUS; SUBCUTANEOUS at 16:05

## 2021-05-06 RX ADMIN — DOCUSATE SODIUM 50 MG AND SENNOSIDES 8.6 MG 1 TABLET: 8.6; 5 TABLET, FILM COATED ORAL at 20:28

## 2021-05-06 RX ADMIN — MAGNESIUM SULFATE 2 G: 2 INJECTION INTRAVENOUS at 07:32

## 2021-05-06 RX ADMIN — ENALAPRILAT 1.25 MG: 1.25 INJECTION INTRAVENOUS at 14:30

## 2021-05-06 RX ADMIN — LABETALOL HYDROCHLORIDE 10 MG: 5 INJECTION, SOLUTION INTRAVENOUS at 13:53

## 2021-05-06 RX ADMIN — HYDRALAZINE HYDROCHLORIDE 20 MG: 20 INJECTION INTRAMUSCULAR; INTRAVENOUS at 11:39

## 2021-05-06 RX ADMIN — CEFAZOLIN SODIUM 1 G: 1 INJECTION, SOLUTION INTRAVENOUS at 05:34

## 2021-05-06 RX ADMIN — LABETALOL HYDROCHLORIDE 10 MG: 5 INJECTION, SOLUTION INTRAVENOUS at 21:01

## 2021-05-06 ASSESSMENT — FIBROSIS 4 INDEX: FIB4 SCORE: 2.07

## 2021-05-06 ASSESSMENT — PAIN DESCRIPTION - PAIN TYPE
TYPE: ACUTE PAIN
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
## 2021-05-06 NOTE — PROGRESS NOTES
2 RN skin check with Martha RN     -Right shoulder puncture wound- 3 staples  -abrasion to right arm  -abrasion ro right hip  abrasion to right waist  -abrasion to right leg and knee  -abrasion to right hand   -facial laceration to right eyebrow, sutured   -abrasion to nose  -bilateral eyes swollen and blue/black   -Left knee abrasion
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
## 2021-05-06 NOTE — PROCEDURES
Procedure Report    Date of Procedure: 5/5/2021    Procedure: Arterial line placement    Surgeon: Deandre Sierra MD    Diagnosis: Trauma, severe TBI    Indications: Cerebral perfusion pressure measurement     Procedure in detail: The area was sterilely prepped and draped. The finder needle was used to locate the radial artery. Once a flash of arterial blood was obtained, a left radial arterial line was placed using the modified seldinger technique.  The catheter was secured to the skin with silk suture and a pressure line was attached to the catheter with good waveform on the monitor.  Sterile dressings were applied. The patient tolerated the procedure well.      Deandre Sierra MD  [facility]  [phone]

## 2021-05-06 NOTE — OR SURGEON
Immediate Post OP Note    PreOp Diagnosis: traumatic brain injury, cerebral contusions, coma, r/o elevated intracranial pressure      PostOp Diagnosis:  traumatic brain injury, cerebral contusions, coma      R frontal ICP monitor placement    Anesthesiologist/Type of Anesthesia:  Anesthesia staff cannot be found from this context./* No surgery found *    Surgical Staff:  FERNANDEZ Mcknight MD    Specimens removed if any:  * Cannot find log *    Estimated Blood Loss: scant    Findings: ICP 12 flat, good waveform    Complications: none    Repeat head CT 4 hours, will follow, sedation prn, Q 1 hr neuro checks        5/5/2021 9:43 PM Elmer Mcknight III, M.D.

## 2021-05-06 NOTE — PROCEDURES
Procedure Report    Date of Procedure: 5/5/2021    Procedure: Central line placement    Surgeon: Deandre Sierra MD    Diagnosis: Trauma, severe TBI    Indications: infusion of hypertonic fluids, need for access    Procedure in detail: Full barrier precautions were used for the procedure including cap, sterile gown, sterile gloves, and sterile full body drape. The patient was placed in the trendelenburg position. The patients right superior anterior chest wall was prepped and draped in the standard sterile fashion   The right subclavian vein was accessed with a needle. The modified seldinger technique was used to place a 7Fr 20cm triple lumen catheter. The catheter was secured to the skin with silk suture.   Sterile dressings were applied, including a biopatch. The patient tolerated the procedure well.  A chest x-ray was ordered for verification.       Deandre Sierra MD  [facility]  [phone]

## 2021-05-06 NOTE — ASSESSMENT & PLAN NOTE
Extensive areas of intraparenchymal hemorrhage consistent with hemorrhagic contusions, and the left temporal and right frontal lobes.  Intracranial monitoring placed in ICU  Hypertonic saline bolus received in ED, not continued per neurosurgery  Follow up head CT with worsening bilateral subarachnoid hemorrhages.  Non-operative management.   Neuro check  Targeting normal serum sodium  Post traumatic pharmacologic seizure prophylaxis for 1 week.  5/8 ICP monitor removed  5/10 MRI: Extensive contusions, shear injury sparing the midbrain, consistent with diffuse axonal injury  Expected long hospital course with still uncertain prognosis  Discussed at length with family, full care for now  Elmer Mcknight MD. Neurosurgeon. [facility].
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
## 2021-05-06 NOTE — PROGRESS NOTES
Cortrak Placement    Tube Team verified patient name and medical record number prior to tube placement.  Cortrak tube (55 inches, 10 Azerbaijani) placed at 70 cm in left nare.  Per Cortrak picture, tube appears to be in the stomach.  Nursing Instructions: Awaiting KUB to confirm placement before use for medications or feeding. Once placement confirmed, flush tube with 30 ml of water, and then remove and save stylet, in patient medication drawer.

## 2021-05-06 NOTE — RESPIRATORY CARE
Ventilator Daily Summary    Vent Day #2    Ventilator settings changed this shift:None    Weaning trials:No     Respiratory Procedures:None    Plan: Continue current ventilator settings and wean mechanical ventilation as tolerated per physician orders.

## 2021-05-06 NOTE — ASSESSMENT & PLAN NOTE
Comminuted fractures of the distal shaft of the right clavicle.  Discussed with orthopedics: They plan to perform ORIF on clavicle  Procedure is yet to be scheduled  Weight bearing status - Nonweightbearing NANCIE.  Vicente Blair MD. Orthopedic Surgeon. [facility].
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
## 2021-05-06 NOTE — ASSESSMENT & PLAN NOTE
Right occipital bone fractures extending into the right petrous ridge.  Non-operative management.  Elmer Mcknight MD. Neurosurgeon. [facility].

## 2021-05-06 NOTE — PROGRESS NOTES
TRAUMA TERTIARY SURVEY     Mental status adequate for full examination?: No    Spine cleared (radiologically and/or clinically): Yes    PHYSICAL EXAMINATION:  Vitals: /59   Pulse 80   Temp 37.7 °C (99.8 °F) (Bladder)   Resp (!) 9   Wt 61.4 kg (135 lb 5.8 oz)   SpO2 99%   Breastfeeding No   Constitutional:     General Appearance: vented with ICP and facial edema.  HEENT:    vented and sedated, unable to access.. unable to get accurate assessment, on sedation,vented.. The extraocular muscles are intact bilaterally. vented, sedated unable to access... The nares and oropharynx are partially obscured by blood and secretions. The midface and jaw are stable. vented, unable to have pt participate..  Neck:    The cervical spine is immobilized with a hard collar.  Respiratory:   Inspection: Mechanically ventilated.   Palpation:  vented.    Auscultation: vented with full support.  Cardiovascular:   Auscultation: normal.   Peripheral Pulses: Normal.   Abdomen:   Abdomen is soft, nontender, without organomegaly or masses.  Genitourinary:   (FEMALE): .  Musculoskeletal:   The pelvis is stable.  No significant angulation, deformity, or soft tissue injury involving the upper and lower extremities. Normal range of motion.   Back:   Pt is vented and sedated, nursing reports no abrasions or deformities on back.  Skin:   The skin is warm.  Neurologic:    [place] Coma Scale (GCS) 3T E1V1M1. Neurologic examination revealed unable to access, vented and sedated.  Psychiatric:   The patient unable to access while pt on vent and sedated.    IMAGING:  DX-ABDOMEN FOR TUBE PLACEMENT   Final Result      Enteric tube tip projects over the stomach.      US-TRAUMA VEIN SCREEN LOWER BILAT EXTREMITY   Final Result      DX-CHEST-PORTABLE (1 VIEW)   Final Result      No acute cardiopulmonary abnormality. No interval change.      CT-HEAD W/O   Final Result      1.  Stable right frontal and left temporal intraparenchymal hemorrhages.   2.   Increasing bilateral subarachnoid hemorrhage, especially on the left.               INTERPRETING LOCATION:  [address] , [place], [address]      DX-CHEST-FOR LINE PLACEMENT Perform procedure in: Patient's Room   Final Result      Right central venous line in place, with no pneumothorax.      CT-CHEST,ABDOMEN,PELVIS WITH   Final Result      1.  Right second and third rib fractures. No other thoracic abnormality.   2.  Unremarkable CT scans of the abdomen and pelvis.      CT-LSPINE W/O PLUS RECONS   Final Result      No evidence of fracture or traumatic subluxation.      CT-TSPINE W/O PLUS RECONS   Final Result   Impression: No evidence of thoracic spine fracture or traumatic malalignment. Right second rib fracture.      CT-CSPINE WITHOUT PLUS RECONS   Final Result      1.  No acute cervical spine fracture or traumatic malalignment.   2.  Right occipital bone fractures extending into the right petrous ridge. These findings were discussed with TIMOTHY FREIRE on 5/5/2021 at 2105 hours.      CT-HEAD W/O   Final Result      1.  Extensive areas of intraparenchymal hemorrhage consistent with hemorrhagic contusions, and the left temporal and right frontal lobes.   2.  Right skull base/orbital fractures as well as left orbital roof fractures.   These findings were discussed with TIMOTHY FREIRE on 5/5/2021 at 2105 hours.            INTERPRETING LOCATION:  [address] [facility], [address] .      CT-MAXILLOFACIAL W/O PLUS RECONS   Final Result      1.  Fractures of the medial, lateral, and roof of the right orbit. The orbital fractures extend to the right squamous temporal bone.   2.  Fractures of the left orbital roof with extension to the left squamous temporal bone.      DX-CHEST-LIMITED (1 VIEW)   Final Result      1.  No acute cardiopulmonary abnormality.   2.  Fractures of the right clavicle and right second and third ribs.               INTERPRETING LOCATION: [address] , [place], [address]      DX-PELVIS-1 OR 2 VIEWS   Final  Result      No acute bony abnormality.      US-ABORTED US PROCEDURE    (Results Pending)     All current laboratory studies/radiology exams reviewed: Yes    Completed Consultations:  Dr. GERONIMO Mcknight - Neurosurgery     Pending Consultations:  No current consults pending    Newly Identified Diagnoses and Injuries:  No new findings on this limited exam    TOTAL RAP SCORE:  RAP Score Total: 14      ETOH Screening     Reason for no ETOH Intervention: Traumatic Brain Injury and Intubated  ETOH Screening     Assessment complete date: 5/6/2021

## 2021-05-06 NOTE — PROGRESS NOTES
Critical lab result of inhibition AA 90.2 inhibition 88.4, and platelets 181  Resulted. Lab called with critical potassium of 2.5. Dr. Lucero updated, new orders received and initiated.

## 2021-05-06 NOTE — PROGRESS NOTES
pts  inquiring where pt's cell phone, clothing and wedding ring are. RN found wedding ring & returned it to pt's  but unable to find cell phone & clothing. RN called ED charge RN to inquire if they could look.
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
## 2021-05-06 NOTE — RESPIRATORY CARE
Respiratory Trauma Red Note  Pt arrived intubated with 7.5 @22, lis replaced and placed on initial vent settings. Sp02 100% on 100%02.

## 2021-05-06 NOTE — CONSULTS
Donor [facility]  Onsite Evalutation    Referral # [id number]    Date 5/6/2021 @ 0644    Thank you for the referral of this patient. A chart review has been completed to determine suitability for organ donation.     Donation is an option.  - Upon meeting criteria for brain death, this patient will be a potential candidate for organ donation, pending further evaluation.   - This patient may meet the criteria for DCD (donation after circulatory death). Further evaluation will be needed should the family decide to withdraw support.   - This patient has been located on an organ donor registry and is a first person authorized donor in the event of their death. A copy of their document of gift has been placed in the hospital chart, it is a legally binding document.     Donor [facility] will continue to follow this case. Guidelines for the management of a potential organ donor have been provided for use at the physician's discretion. Please call us immediately with any problems, questions, or significant changes in the patient's status. Please call us immediately with any plans for brain death evaluation, family meetings or plans to withdraw care.     Organ donation should not be mentioned without prior collaboration with [facility] so that the conversation can be a planned, one-time coordinated event with the health care team.     Call [phone] with any immediate needs.     Thank you for your continued support of organ and tissue donation.

## 2021-05-06 NOTE — PROGRESS NOTES
Events noted  Full note will be dictated  Bilateral echymosis  No enophthalmos  No exophthalmos  Pupils sluggish  No skeletal asymmetry    A/p-orbital fractures, non operative at this time, will re-eval once the patient can participate with her exam

## 2021-05-06 NOTE — PROGRESS NOTES
2030: Patient to s108 on monitor with RT, ACLS RN, APRN, on ventilator and in c- collar. Spinal precautions used to transfer patient to ICU bed.    Initial assessment: L R pupil 5 mm, reactive, left pupil 4 mm, reactive,  Spontaneous movement lower extremities, no movement in upper extremities    Temp: 96.8 F, warm blankets and ana paula hugger applied    Weight: 61.4 kg    Dr. Sierra and Ivis MATA at bedside to place emergent central line and emergent arterial line    Hoffmann catheter placed per MD orders    2057: Dr Mcknight at bedside  2120: Dr Mcknight placing bolt emergently    All vitals signs during procedures documented per flowsheets    Patient's  is Thomas Lombardi  ([phone]), he states that patient has no medication allergies and does not take any home medications.
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
## 2021-05-06 NOTE — ASSESSMENT & PLAN NOTE
Intubated in field for GCS 4.  5/11 no progress with weaning due to brain injury  Percutaneous tracheostomy  SICU tracheostomy weaning and decannulation protocol  Ventilator bundle

## 2021-05-06 NOTE — ASSESSMENT & PLAN NOTE
Prophylactic anticoagulation for thrombotic prevention initially contraindicated secondary to elevated bleeding risk.   5/6 Trauma screening duplex negative for above knee DVT
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
## 2021-05-06 NOTE — ASSESSMENT & PLAN NOTE
Right 2nd and 3rd rib fractures  Blunt chest protocol. Aggressive pulmonary hygiene and pain management.  Serial CXRs  5/6 No acute cardiopulmonary abnormality
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
## 2021-05-06 NOTE — ASSESSMENT & PLAN NOTE
says patient does not take any medications.   AA inhibition 90% on admission TEG  Considering severity of intracranial injury, transfused 1 U Platelets  Repeat TEG 5/6 AA inhibition 65.1

## 2021-05-07 ENCOUNTER — HOSPITAL ENCOUNTER (OUTPATIENT)
Dept: RADIOLOGY | Facility: MEDICAL CENTER | Age: 56
End: 2021-05-07
Attending: SURGERY

## 2021-05-07 LAB
ALBUMIN SERPL BCP-MCNC: 3.2 G/DL (ref 3.2–4.9)
ALBUMIN/GLOB SERPL: 1.3 G/DL
ALP SERPL-CCNC: 50 U/L (ref 30–99)
ALT SERPL-CCNC: 31 U/L (ref 2–50)
ANION GAP SERPL CALC-SCNC: 5 MMOL/L (ref 7–16)
AST SERPL-CCNC: 45 U/L (ref 12–45)
BASOPHILS # BLD AUTO: 0.2 % (ref 0–1.8)
BASOPHILS # BLD: 0.02 K/UL (ref 0–0.12)
BILIRUB SERPL-MCNC: 0.4 MG/DL (ref 0.1–1.5)
BUN SERPL-MCNC: 14 MG/DL (ref 8–22)
CALCIUM SERPL-MCNC: 8.2 MG/DL (ref 8.5–10.5)
CHLORIDE SERPL-SCNC: 113 MMOL/L (ref 96–112)
CO2 SERPL-SCNC: 22 MMOL/L (ref 20–33)
CREAT SERPL-MCNC: 0.47 MG/DL (ref 0.5–1.4)
CRP SERPL HS-MCNC: 10.02 MG/DL (ref 0–0.75)
EOSINOPHIL # BLD AUTO: 0.01 K/UL (ref 0–0.51)
EOSINOPHIL NFR BLD: 0.1 % (ref 0–6.9)
ERYTHROCYTE [DISTWIDTH] IN BLOOD BY AUTOMATED COUNT: 47.4 FL (ref 35.9–50)
GLOBULIN SER CALC-MCNC: 2.4 G/DL (ref 1.9–3.5)
GLUCOSE SERPL-MCNC: 153 MG/DL (ref 65–99)
HCT VFR BLD AUTO: 29.7 % (ref 37–47)
HGB BLD-MCNC: 10 G/DL (ref 12–16)
IMM GRANULOCYTES # BLD AUTO: 0.05 K/UL (ref 0–0.11)
IMM GRANULOCYTES NFR BLD AUTO: 0.4 % (ref 0–0.9)
LYMPHOCYTES # BLD AUTO: 0.99 K/UL (ref 1–4.8)
LYMPHOCYTES NFR BLD: 8.4 % (ref 22–41)
MAGNESIUM SERPL-MCNC: 2 MG/DL (ref 1.5–2.5)
MCH RBC QN AUTO: 33 PG (ref 27–33)
MCHC RBC AUTO-ENTMCNC: 33.7 G/DL (ref 33.6–35)
MCV RBC AUTO: 98 FL (ref 81.4–97.8)
MONOCYTES # BLD AUTO: 0.83 K/UL (ref 0–0.85)
MONOCYTES NFR BLD AUTO: 7 % (ref 0–13.4)
NEUTROPHILS # BLD AUTO: 9.89 K/UL (ref 2–7.15)
NEUTROPHILS NFR BLD: 83.9 % (ref 44–72)
NRBC # BLD AUTO: 0 K/UL
NRBC BLD-RTO: 0 /100 WBC
PHOSPHATE SERPL-MCNC: 1.8 MG/DL (ref 2.5–4.5)
PLATELET # BLD AUTO: 165 K/UL (ref 164–446)
PMV BLD AUTO: 9.3 FL (ref 9–12.9)
POTASSIUM SERPL-SCNC: 4 MMOL/L (ref 3.6–5.5)
PREALB SERPL-MCNC: 12.3 MG/DL (ref 18–38)
PROT SERPL-MCNC: 5.6 G/DL (ref 6–8.2)
RBC # BLD AUTO: 3.03 M/UL (ref 4.2–5.4)
SODIUM SERPL-SCNC: 140 MMOL/L (ref 135–145)
WBC # BLD AUTO: 11.8 K/UL (ref 4.8–10.8)

## 2021-05-07 PROCEDURE — 94003 VENT MGMT INPAT SUBQ DAY: CPT

## 2021-05-07 PROCEDURE — 700111 HCHG RX REV CODE 636 W/ 250 OVERRIDE (IP): Performed by: NEUROLOGICAL SURGERY

## 2021-05-07 PROCEDURE — 83735 ASSAY OF MAGNESIUM: CPT

## 2021-05-07 PROCEDURE — 94760 N-INVAS EAR/PLS OXIMETRY 1: CPT

## 2021-05-07 PROCEDURE — 770022 HCHG ROOM/CARE - ICU (200)

## 2021-05-07 PROCEDURE — 94799 UNLISTED PULMONARY SVC/PX: CPT

## 2021-05-07 PROCEDURE — 71045 X-RAY EXAM CHEST 1 VIEW: CPT

## 2021-05-07 PROCEDURE — 84134 ASSAY OF PREALBUMIN: CPT

## 2021-05-07 PROCEDURE — 99291 CRITICAL CARE FIRST HOUR: CPT | Performed by: SURGERY

## 2021-05-07 PROCEDURE — 86140 C-REACTIVE PROTEIN: CPT

## 2021-05-07 PROCEDURE — A9270 NON-COVERED ITEM OR SERVICE: HCPCS | Performed by: SURGERY

## 2021-05-07 PROCEDURE — 700111 HCHG RX REV CODE 636 W/ 250 OVERRIDE (IP): Performed by: SURGERY

## 2021-05-07 PROCEDURE — 85025 COMPLETE CBC W/AUTO DIFF WBC: CPT

## 2021-05-07 PROCEDURE — 84100 ASSAY OF PHOSPHORUS: CPT

## 2021-05-07 PROCEDURE — 80053 COMPREHEN METABOLIC PANEL: CPT

## 2021-05-07 PROCEDURE — 700102 HCHG RX REV CODE 250 W/ 637 OVERRIDE(OP): Performed by: SURGERY

## 2021-05-07 RX ADMIN — OXYCODONE 10 MG: 5 TABLET ORAL at 07:57

## 2021-05-07 RX ADMIN — HYDROMORPHONE HYDROCHLORIDE 0.5 MG: 1 INJECTION, SOLUTION INTRAMUSCULAR; INTRAVENOUS; SUBCUTANEOUS at 05:39

## 2021-05-07 RX ADMIN — AMLODIPINE BESYLATE 10 MG: 10 TABLET ORAL at 05:12

## 2021-05-07 RX ADMIN — DOCUSATE SODIUM 100 MG: 50 LIQUID ORAL at 05:12

## 2021-05-07 RX ADMIN — HYDROMORPHONE HYDROCHLORIDE 0.5 MG: 1 INJECTION, SOLUTION INTRAMUSCULAR; INTRAVENOUS; SUBCUTANEOUS at 08:06

## 2021-05-07 RX ADMIN — ACETAMINOPHEN 650 MG: 325 TABLET, FILM COATED ORAL at 05:12

## 2021-05-07 RX ADMIN — HYDROMORPHONE HYDROCHLORIDE 0.5 MG: 1 INJECTION, SOLUTION INTRAMUSCULAR; INTRAVENOUS; SUBCUTANEOUS at 17:45

## 2021-05-07 RX ADMIN — LEVETIRACETAM 500 MG: 500 TABLET ORAL at 05:12

## 2021-05-07 RX ADMIN — ACETAMINOPHEN 650 MG: 325 TABLET, FILM COATED ORAL at 00:25

## 2021-05-07 RX ADMIN — DOCUSATE SODIUM 100 MG: 50 LIQUID ORAL at 17:04

## 2021-05-07 RX ADMIN — CEFAZOLIN SODIUM 1 G: 1 INJECTION, SOLUTION INTRAVENOUS at 12:37

## 2021-05-07 RX ADMIN — CEFAZOLIN SODIUM 1 G: 1 INJECTION, SOLUTION INTRAVENOUS at 05:20

## 2021-05-07 RX ADMIN — OXYCODONE 10 MG: 5 TABLET ORAL at 21:00

## 2021-05-07 RX ADMIN — DIBASIC SODIUM PHOSPHATE, MONOBASIC POTASSIUM PHOSPHATE AND MONOBASIC SODIUM PHOSPHATE 500 MG: 852; 155; 130 TABLET ORAL at 17:04

## 2021-05-07 RX ADMIN — CEFAZOLIN SODIUM 1 G: 1 INJECTION, SOLUTION INTRAVENOUS at 21:00

## 2021-05-07 RX ADMIN — OXYCODONE 10 MG: 5 TABLET ORAL at 17:04

## 2021-05-07 RX ADMIN — HYDROMORPHONE HYDROCHLORIDE 0.5 MG: 1 INJECTION, SOLUTION INTRAMUSCULAR; INTRAVENOUS; SUBCUTANEOUS at 14:52

## 2021-05-07 RX ADMIN — LABETALOL HYDROCHLORIDE 10 MG: 5 INJECTION, SOLUTION INTRAVENOUS at 02:13

## 2021-05-07 RX ADMIN — HYDROMORPHONE HYDROCHLORIDE 0.5 MG: 1 INJECTION, SOLUTION INTRAMUSCULAR; INTRAVENOUS; SUBCUTANEOUS at 02:34

## 2021-05-07 RX ADMIN — HYDROMORPHONE HYDROCHLORIDE 0.5 MG: 1 INJECTION, SOLUTION INTRAMUSCULAR; INTRAVENOUS; SUBCUTANEOUS at 11:23

## 2021-05-07 RX ADMIN — POLYETHYLENE GLYCOL 3350 1 PACKET: 17 POWDER, FOR SOLUTION ORAL at 05:12

## 2021-05-07 RX ADMIN — HYDROMORPHONE HYDROCHLORIDE 0.5 MG: 1 INJECTION, SOLUTION INTRAMUSCULAR; INTRAVENOUS; SUBCUTANEOUS at 20:10

## 2021-05-07 RX ADMIN — DIBASIC SODIUM PHOSPHATE, MONOBASIC POTASSIUM PHOSPHATE AND MONOBASIC SODIUM PHOSPHATE 500 MG: 852; 155; 130 TABLET ORAL at 07:58

## 2021-05-07 RX ADMIN — FAMOTIDINE 20 MG: 20 TABLET ORAL at 05:12

## 2021-05-07 RX ADMIN — FAMOTIDINE 20 MG: 20 TABLET ORAL at 17:04

## 2021-05-07 RX ADMIN — ACETAMINOPHEN 650 MG: 325 TABLET, FILM COATED ORAL at 17:05

## 2021-05-07 RX ADMIN — DOCUSATE SODIUM 50 MG AND SENNOSIDES 8.6 MG 1 TABLET: 8.6; 5 TABLET, FILM COATED ORAL at 21:00

## 2021-05-07 RX ADMIN — LEVETIRACETAM 500 MG: 500 TABLET ORAL at 17:05

## 2021-05-07 RX ADMIN — POLYETHYLENE GLYCOL 3350 1 PACKET: 17 POWDER, FOR SOLUTION ORAL at 17:04

## 2021-05-07 RX ADMIN — HYDRALAZINE HYDROCHLORIDE 20 MG: 20 INJECTION INTRAMUSCULAR; INTRAVENOUS at 05:39

## 2021-05-07 RX ADMIN — MAGNESIUM HYDROXIDE 30 ML: 400 SUSPENSION ORAL at 05:12

## 2021-05-07 RX ADMIN — LABETALOL HYDROCHLORIDE 10 MG: 5 INJECTION, SOLUTION INTRAVENOUS at 14:44

## 2021-05-07 RX ADMIN — ACETAMINOPHEN 650 MG: 325 TABLET, FILM COATED ORAL at 11:55

## 2021-05-07 ASSESSMENT — PAIN DESCRIPTION - PAIN TYPE
TYPE: ACUTE PAIN
TYPE: ACUTE PAIN;SURGICAL PAIN
TYPE: ACUTE PAIN
TYPE: ACUTE PAIN;SURGICAL PAIN
TYPE: ACUTE PAIN
TYPE: ACUTE PAIN;SURGICAL PAIN

## 2021-05-07 ASSESSMENT — FIBROSIS 4 INDEX: FIB4 SCORE: 2.07

## 2021-05-07 NOTE — CARE PLAN
Problem: Respiratory:  Goal: Respiratory status will improve  Outcome: PROGRESSING SLOWER THAN EXPECTED     Problem: Safety - Medical Restraint  Goal: Remains free of injury from restraints (Restraint for Interference with Medical Device)  Description: INTERVENTIONS:  1. Determine that other, less restrictive measures have been tried or would not be effective before applying the restraint  2. Evaluate the patient's condition at the time of restraint application  3. Inform patient/family regarding the reason for restraint  4. Q2H: Monitor safety, psychosocial status, comfort, nutrition and hydration  Outcome: PROGRESSING SLOWER THAN EXPECTED
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
## 2021-05-07 NOTE — CARE PLAN
Problem: Skin Integrity  Goal: Risk for impaired skin integrity will decrease  Outcome: PROGRESSING AS EXPECTED     Problem: Safety - Medical Restraint  Goal: Remains free of injury from restraints (Restraint for Interference with Medical Device)  Description: INTERVENTIONS:  1. Determine that other, less restrictive measures have been tried or would not be effective before applying the restraint  2. Evaluate the patient's condition at the time of restraint application  3. Inform patient/family regarding the reason for restraint  4. Q2H: Monitor safety, psychosocial status, comfort, nutrition and hydration  Outcome: PROGRESSING AS EXPECTED  Goal: Free from restraint(s) (Restraint for Interference with Medical Device)  Description: INTERVENTIONS:  1. ONCE/SHIFT or MINIMUM Q12H: Assess and document the continuing need for restraints  2. Q24H: Continued use of restraint requires LIP to perform face to face examination and written order  3. Identify and implement measures to help patient regain control  Outcome: PROGRESSING AS EXPECTED
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
## 2021-05-07 NOTE — RESPIRATORY CARE
Ventilator Daily Summary    Vent Day # 3    Ventilator settings changed this shift: None,     Weaning trials: No SBT's do to ICP increasing with anxiety.    Respiratory Procedures:    Plan: Continue current ventilator settings and wean mechanical ventilation as tolerated per physician orders.

## 2021-05-07 NOTE — PROGRESS NOTES
2 RN skin check completed with Karla SCHAEFER     -Bilateral eyes have severe swelling   -Laceration to right eyebrow, sutures in place, open to air   -Puncture wound to right shoulder, 3 staples in place, open to air   -Multiple abrasions to right arm, dressed with biotin   -abrasion to right hip   -abrasion to right waist   -abrasion to right knee and leg   -abrasion to right hand   -abrasion to left knee   -bolt in place, periwound intact     Devices in place  Cardiac leads  SCDs (leg abrasion dressed with biotin)   BP cuff  PIV   Central line  Arterial line    Restraints      Skin is C/D/I under/around all above listed devices

## 2021-05-08 ENCOUNTER — APPOINTMENT (OUTPATIENT)
Dept: RADIOLOGY | Facility: MEDICAL CENTER | Age: 56
DRG: 004 | End: 2021-05-08
Attending: SURGERY
Payer: OTHER MISCELLANEOUS

## 2021-05-08 LAB
ANION GAP SERPL CALC-SCNC: 4 MMOL/L (ref 7–16)
BASOPHILS # BLD AUTO: 0.1 % (ref 0–1.8)
BASOPHILS # BLD: 0.01 K/UL (ref 0–0.12)
BUN SERPL-MCNC: 15 MG/DL (ref 8–22)
CALCIUM SERPL-MCNC: 8.2 MG/DL (ref 8.5–10.5)
CHLORIDE SERPL-SCNC: 108 MMOL/L (ref 96–112)
CO2 SERPL-SCNC: 27 MMOL/L (ref 20–33)
CREAT SERPL-MCNC: 0.43 MG/DL (ref 0.5–1.4)
EOSINOPHIL # BLD AUTO: 0 K/UL (ref 0–0.51)
EOSINOPHIL NFR BLD: 0 % (ref 0–6.9)
ERYTHROCYTE [DISTWIDTH] IN BLOOD BY AUTOMATED COUNT: 49.1 FL (ref 35.9–50)
GLUCOSE SERPL-MCNC: 131 MG/DL (ref 65–99)
HCT VFR BLD AUTO: 28.5 % (ref 37–47)
HGB BLD-MCNC: 9.2 G/DL (ref 12–16)
IMM GRANULOCYTES # BLD AUTO: 0.04 K/UL (ref 0–0.11)
IMM GRANULOCYTES NFR BLD AUTO: 0.4 % (ref 0–0.9)
LYMPHOCYTES # BLD AUTO: 1.18 K/UL (ref 1–4.8)
LYMPHOCYTES NFR BLD: 12.5 % (ref 22–41)
MCH RBC QN AUTO: 32.4 PG (ref 27–33)
MCHC RBC AUTO-ENTMCNC: 32.3 G/DL (ref 33.6–35)
MCV RBC AUTO: 100.4 FL (ref 81.4–97.8)
MONOCYTES # BLD AUTO: 0.69 K/UL (ref 0–0.85)
MONOCYTES NFR BLD AUTO: 7.3 % (ref 0–13.4)
NEUTROPHILS # BLD AUTO: 7.5 K/UL (ref 2–7.15)
NEUTROPHILS NFR BLD: 79.7 % (ref 44–72)
NRBC # BLD AUTO: 0 K/UL
NRBC BLD-RTO: 0 /100 WBC
PLATELET # BLD AUTO: 151 K/UL (ref 164–446)
PMV BLD AUTO: 9.5 FL (ref 9–12.9)
POTASSIUM SERPL-SCNC: 3.6 MMOL/L (ref 3.6–5.5)
RBC # BLD AUTO: 2.84 M/UL (ref 4.2–5.4)
SODIUM SERPL-SCNC: 139 MMOL/L (ref 135–145)
WBC # BLD AUTO: 9.4 K/UL (ref 4.8–10.8)

## 2021-05-08 PROCEDURE — 71045 X-RAY EXAM CHEST 1 VIEW: CPT

## 2021-05-08 PROCEDURE — 99291 CRITICAL CARE FIRST HOUR: CPT | Performed by: SURGERY

## 2021-05-08 PROCEDURE — 700111 HCHG RX REV CODE 636 W/ 250 OVERRIDE (IP): Performed by: SURGERY

## 2021-05-08 PROCEDURE — 80048 BASIC METABOLIC PNL TOTAL CA: CPT

## 2021-05-08 PROCEDURE — 700111 HCHG RX REV CODE 636 W/ 250 OVERRIDE (IP): Performed by: NEUROLOGICAL SURGERY

## 2021-05-08 PROCEDURE — 770022 HCHG ROOM/CARE - ICU (200)

## 2021-05-08 PROCEDURE — 85025 COMPLETE CBC W/AUTO DIFF WBC: CPT

## 2021-05-08 PROCEDURE — A9270 NON-COVERED ITEM OR SERVICE: HCPCS | Performed by: SURGERY

## 2021-05-08 PROCEDURE — 94799 UNLISTED PULMONARY SVC/PX: CPT

## 2021-05-08 PROCEDURE — 700102 HCHG RX REV CODE 250 W/ 637 OVERRIDE(OP): Performed by: SURGERY

## 2021-05-08 PROCEDURE — 94003 VENT MGMT INPAT SUBQ DAY: CPT

## 2021-05-08 RX ADMIN — ACETAMINOPHEN 650 MG: 325 TABLET, FILM COATED ORAL at 11:03

## 2021-05-08 RX ADMIN — POLYETHYLENE GLYCOL 3350 1 PACKET: 17 POWDER, FOR SOLUTION ORAL at 17:09

## 2021-05-08 RX ADMIN — OXYCODONE 10 MG: 5 TABLET ORAL at 21:07

## 2021-05-08 RX ADMIN — LABETALOL HYDROCHLORIDE 10 MG: 5 INJECTION, SOLUTION INTRAVENOUS at 17:02

## 2021-05-08 RX ADMIN — HYDROMORPHONE HYDROCHLORIDE 0.5 MG: 1 INJECTION, SOLUTION INTRAMUSCULAR; INTRAVENOUS; SUBCUTANEOUS at 00:16

## 2021-05-08 RX ADMIN — HYDROMORPHONE HYDROCHLORIDE 0.5 MG: 1 INJECTION, SOLUTION INTRAMUSCULAR; INTRAVENOUS; SUBCUTANEOUS at 05:16

## 2021-05-08 RX ADMIN — ACETAMINOPHEN 650 MG: 325 TABLET, FILM COATED ORAL at 00:17

## 2021-05-08 RX ADMIN — DOCUSATE SODIUM 100 MG: 50 LIQUID ORAL at 17:09

## 2021-05-08 RX ADMIN — CEFAZOLIN SODIUM 1 G: 1 INJECTION, SOLUTION INTRAVENOUS at 04:30

## 2021-05-08 RX ADMIN — ACETAMINOPHEN 650 MG: 325 TABLET, FILM COATED ORAL at 17:09

## 2021-05-08 RX ADMIN — LEVETIRACETAM 500 MG: 500 TABLET ORAL at 06:32

## 2021-05-08 RX ADMIN — POLYETHYLENE GLYCOL 3350 1 PACKET: 17 POWDER, FOR SOLUTION ORAL at 06:32

## 2021-05-08 RX ADMIN — FAMOTIDINE 20 MG: 20 TABLET ORAL at 06:33

## 2021-05-08 RX ADMIN — DOCUSATE SODIUM 100 MG: 50 LIQUID ORAL at 06:32

## 2021-05-08 RX ADMIN — OXYCODONE 10 MG: 5 TABLET ORAL at 17:09

## 2021-05-08 RX ADMIN — OXYCODONE 10 MG: 5 TABLET ORAL at 02:23

## 2021-05-08 RX ADMIN — HYDROMORPHONE HYDROCHLORIDE 0.5 MG: 1 INJECTION, SOLUTION INTRAMUSCULAR; INTRAVENOUS; SUBCUTANEOUS at 00:55

## 2021-05-08 RX ADMIN — ENALAPRILAT 1.25 MG: 1.25 INJECTION INTRAVENOUS at 15:18

## 2021-05-08 RX ADMIN — OXYCODONE 10 MG: 5 TABLET ORAL at 11:03

## 2021-05-08 RX ADMIN — MAGNESIUM HYDROXIDE 30 ML: 400 SUSPENSION ORAL at 06:32

## 2021-05-08 RX ADMIN — OXYCODONE 10 MG: 5 TABLET ORAL at 06:32

## 2021-05-08 RX ADMIN — HYDROMORPHONE HYDROCHLORIDE 0.5 MG: 1 INJECTION, SOLUTION INTRAMUSCULAR; INTRAVENOUS; SUBCUTANEOUS at 15:22

## 2021-05-08 RX ADMIN — ACETAMINOPHEN 650 MG: 325 TABLET, FILM COATED ORAL at 06:32

## 2021-05-08 RX ADMIN — HYDROMORPHONE HYDROCHLORIDE 0.5 MG: 1 INJECTION, SOLUTION INTRAMUSCULAR; INTRAVENOUS; SUBCUTANEOUS at 04:52

## 2021-05-08 RX ADMIN — DOCUSATE SODIUM 50 MG AND SENNOSIDES 8.6 MG 1 TABLET: 8.6; 5 TABLET, FILM COATED ORAL at 21:07

## 2021-05-08 RX ADMIN — LEVETIRACETAM 500 MG: 500 TABLET ORAL at 17:10

## 2021-05-08 RX ADMIN — AMLODIPINE BESYLATE 10 MG: 10 TABLET ORAL at 06:32

## 2021-05-08 RX ADMIN — POTASSIUM BICARBONATE 25 MEQ: 978 TABLET, EFFERVESCENT ORAL at 09:25

## 2021-05-08 RX ADMIN — FAMOTIDINE 20 MG: 20 TABLET ORAL at 17:10

## 2021-05-08 ASSESSMENT — PAIN DESCRIPTION - PAIN TYPE
TYPE: ACUTE PAIN

## 2021-05-08 ASSESSMENT — FIBROSIS 4 INDEX: FIB4 SCORE: 2.74
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
## 2021-05-08 NOTE — CARE PLAN
Problem: Communication  Goal: The ability to communicate needs accurately and effectively will improve  Outcome: PROGRESSING AS EXPECTED     Problem: Safety  Goal: Will remain free from injury  Outcome: PROGRESSING AS EXPECTED   Bed is in lowest and locked position, call light is within reach, bed alarm is on. Patient oriented to room, plan of care and educated on how to call for when they need assistance. Patient educated to not get out of bed without staff present. Patient educated on safety instructions for mobility.     Problem: Infection  Goal: Will remain free from infection  Outcome: PROGRESSING AS EXPECTED   Patient and significant other educated by RN on hand hygiene and other infection protocols in use by RN + staff. RN to watch WBC's, bands, in CBC to assess for infection. RN to assess temperature q2 hour w/ appropriate interventions as needed. RN to assess LDA's for signs of infection and to discuss with IDT for need of LDA.
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
## 2021-05-08 NOTE — CARE PLAN
Ventilator Daily Summary     Vent Day #4     Ventilator settings changed this shift: None     Weaning trials: None     Respiratory Procedures:No     Plan: Continue current ventilator settings and wean mechanical ventilation as tolerated per physician orders.

## 2021-05-08 NOTE — PROGRESS NOTES
2 RN skin check completed      Areas of concern/Skin observations:  -Bilateral eyes have severe swelling   -Laceration to right eyebrow, sutures in place, open to air   -Puncture wound to right shoulder, 3 staples in place, open to air   -Multiple abrasions to right arm, dressed with biotin   -abrasion to right hip   -abrasion to right waist   -abrasion to right knee and leg   -abrasion to right hand   -abrasion to left knee   -bolt in place, periwound intact     Devices in use, assessed under and interventions (as appropriate) for skin protection:  · SCDs, EKG leads, SPo2 probe, BP cuff  · Arterial line  · Hoffmann catheter  · ETT  · [place]  · cortrak  · CVC  · PIV's  · c-collar     Interventions in place such as:   · TAPs system  · q2 turns and device repositioning  · mepilex under c-collar and on sacrum  · Heel float boots  · Bony prominences padded  · Honey colloid dressings on large abrasions

## 2021-05-08 NOTE — RESPIRATORY CARE
Ventilator Daily Summary    Vent Day #3    Ventilator settings changed this shift:None    Weaning trials:None    Respiratory Procedures:No    Plan: Continue current ventilator settings and wean mechanical ventilation as tolerated per physician orders.

## 2021-05-08 NOTE — CARE PLAN
Problem: Safety  Goal: Will remain free from injury  Outcome: PROGRESSING AS EXPECTED  Goal: Will remain free from falls  Outcome: PROGRESSING AS EXPECTED     Problem: Infection  Goal: Will remain free from infection  Outcome: PROGRESSING AS EXPECTED     Problem: Venous Thromboembolism (VTW)/Deep Vein Thrombosis (DVT) Prevention:  Goal: Patient will participate in Venous Thrombosis (VTE)/Deep Vein Thrombosis (DVT)Prevention Measures  Outcome: PROGRESSING AS EXPECTED     Problem: Skin Integrity  Goal: Risk for impaired skin integrity will decrease  Outcome: PROGRESSING AS EXPECTED     Problem: Safety - Medical Restraint  Goal: Remains free of injury from restraints (Restraint for Interference with Medical Device)  Description: INTERVENTIONS:  1. Determine that other, less restrictive measures have been tried or would not be effective before applying the restraint  2. Evaluate the patient's condition at the time of restraint application  3. Inform patient/family regarding the reason for restraint  4. Q2H: Monitor safety, psychosocial status, comfort, nutrition and hydration  Outcome: PROGRESSING AS EXPECTED     Problem: Communication  Goal: The ability to communicate needs accurately and effectively will improve  Outcome: PROGRESSING SLOWER THAN EXPECTED  Note: Pt unable to communicate needs at this time. Utilizing body language to assess patient's level of comfort and needs.     Problem: Bowel/Gastric:  Goal: Normal bowel function is maintained or improved  Outcome: PROGRESSING SLOWER THAN EXPECTED  Goal: Will not experience complications related to bowel motility  Outcome: PROGRESSING SLOWER THAN EXPECTED     Problem: Respiratory:  Goal: Respiratory status will improve  Outcome: PROGRESSING SLOWER THAN EXPECTED     Problem: Safety - Medical Restraint  Goal: Free from restraint(s) (Restraint for Interference with Medical Device)  Description: INTERVENTIONS:  1. ONCE/SHIFT or MINIMUM Q12H: Assess and document the  continuing need for restraints  2. Q24H: Continued use of restraint requires LIP to perform face to face examination and written order  3. Identify and implement measures to help patient regain control  Outcome: PROGRESSING SLOWER THAN EXPECTED  Note: Pt attempts to purposefully grab at devices when restraints are released for repositioning and range of motion exercises.     Problem: Pain Management  Goal: Pain level will decrease to patient's comfort goal  Outcome: PROGRESSING SLOWER THAN EXPECTED  Note: Pt unable to communicate pain at this time. Utilizing body language to assess discomfort. Applying pharm and non-pharmacological means to aid in patient's level of comfort and maintain ICPs <25.

## 2021-05-08 NOTE — PROGRESS NOTES
2 RN skin check completed      Areas of concern/Skin observations:  -Bilateral eyes have severe swelling   -Laceration to right eyebrow, sutures in place, open to air   -Puncture wound to right shoulder, 3 staples in place, open to air   -Multiple abrasions to right arm, dressed with biotin   -abrasion to right hip   -abrasion to right waist   -abrasion to right knee and leg   -abrasion to right hand   -abrasion to left knee   -bolt in place, periwound intact      Devices in use, assessed under and interventions (as appropriate) for skin protection:  · SCDs, EKG leads, SPo2 probe, BP cuff  · Arterial line  · Hoffmann catheter  · ETT  · [place]  · cortrak  · CVC  · PIV's  · c-collar     Interventions in place such as:   · TAPs system  · q2 turns and device repositioning  · mepilex under c-collar and on sacrum  · Heel float boots  · Bony prominences padded  · Honey colloid dressings on large abrasions
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
## 2021-05-09 ENCOUNTER — APPOINTMENT (OUTPATIENT)
Dept: RADIOLOGY | Facility: MEDICAL CENTER | Age: 56
DRG: 004 | End: 2021-05-09
Attending: SURGERY
Payer: OTHER MISCELLANEOUS

## 2021-05-09 PROBLEM — D53.9 MACROCYTIC ANEMIA: Status: ACTIVE | Noted: 2021-05-09

## 2021-05-09 LAB
ANION GAP SERPL CALC-SCNC: 7 MMOL/L (ref 7–16)
BASOPHILS # BLD AUTO: 0.1 % (ref 0–1.8)
BASOPHILS # BLD: 0.01 K/UL (ref 0–0.12)
BUN SERPL-MCNC: 15 MG/DL (ref 8–22)
CALCIUM SERPL-MCNC: 8.3 MG/DL (ref 8.5–10.5)
CHLORIDE SERPL-SCNC: 109 MMOL/L (ref 96–112)
CO2 SERPL-SCNC: 27 MMOL/L (ref 20–33)
CREAT SERPL-MCNC: 0.41 MG/DL (ref 0.5–1.4)
EOSINOPHIL # BLD AUTO: 0.01 K/UL (ref 0–0.51)
EOSINOPHIL NFR BLD: 0.1 % (ref 0–6.9)
ERYTHROCYTE [DISTWIDTH] IN BLOOD BY AUTOMATED COUNT: 47.8 FL (ref 35.9–50)
GLUCOSE SERPL-MCNC: 142 MG/DL (ref 65–99)
HCT VFR BLD AUTO: 29.8 % (ref 37–47)
HGB BLD-MCNC: 9.5 G/DL (ref 12–16)
IMM GRANULOCYTES # BLD AUTO: 0.06 K/UL (ref 0–0.11)
IMM GRANULOCYTES NFR BLD AUTO: 0.8 % (ref 0–0.9)
LYMPHOCYTES # BLD AUTO: 0.77 K/UL (ref 1–4.8)
LYMPHOCYTES NFR BLD: 9.7 % (ref 22–41)
MCH RBC QN AUTO: 32 PG (ref 27–33)
MCHC RBC AUTO-ENTMCNC: 31.9 G/DL (ref 33.6–35)
MCV RBC AUTO: 100.3 FL (ref 81.4–97.8)
MONOCYTES # BLD AUTO: 0.54 K/UL (ref 0–0.85)
MONOCYTES NFR BLD AUTO: 6.8 % (ref 0–13.4)
NEUTROPHILS # BLD AUTO: 6.56 K/UL (ref 2–7.15)
NEUTROPHILS NFR BLD: 82.5 % (ref 44–72)
NRBC # BLD AUTO: 0 K/UL
NRBC BLD-RTO: 0 /100 WBC
PLATELET # BLD AUTO: 158 K/UL (ref 164–446)
PMV BLD AUTO: 9.8 FL (ref 9–12.9)
POTASSIUM SERPL-SCNC: 3.6 MMOL/L (ref 3.6–5.5)
RBC # BLD AUTO: 2.97 M/UL (ref 4.2–5.4)
SODIUM SERPL-SCNC: 143 MMOL/L (ref 135–145)
WBC # BLD AUTO: 8 K/UL (ref 4.8–10.8)

## 2021-05-09 PROCEDURE — 99291 CRITICAL CARE FIRST HOUR: CPT | Performed by: SURGERY

## 2021-05-09 PROCEDURE — 700102 HCHG RX REV CODE 250 W/ 637 OVERRIDE(OP): Performed by: SURGERY

## 2021-05-09 PROCEDURE — 85025 COMPLETE CBC W/AUTO DIFF WBC: CPT

## 2021-05-09 PROCEDURE — 71045 X-RAY EXAM CHEST 1 VIEW: CPT

## 2021-05-09 PROCEDURE — 80048 BASIC METABOLIC PNL TOTAL CA: CPT

## 2021-05-09 PROCEDURE — 700101 HCHG RX REV CODE 250: Performed by: SURGERY

## 2021-05-09 PROCEDURE — A9270 NON-COVERED ITEM OR SERVICE: HCPCS | Performed by: SURGERY

## 2021-05-09 PROCEDURE — 770022 HCHG ROOM/CARE - ICU (200)

## 2021-05-09 PROCEDURE — 700111 HCHG RX REV CODE 636 W/ 250 OVERRIDE (IP): Performed by: SURGERY

## 2021-05-09 PROCEDURE — 94003 VENT MGMT INPAT SUBQ DAY: CPT

## 2021-05-09 PROCEDURE — 94799 UNLISTED PULMONARY SVC/PX: CPT

## 2021-05-09 RX ORDER — FOLIC ACID 1 MG/1
1 TABLET ORAL DAILY
Status: DISCONTINUED | OUTPATIENT
Start: 2021-05-10 | End: 2021-05-12 | Stop reason: HOSPADM

## 2021-05-09 RX ORDER — FOLIC ACID 1 MG/1
1 TABLET ORAL DAILY
Status: DISCONTINUED | OUTPATIENT
Start: 2021-05-10 | End: 2021-05-09

## 2021-05-09 RX ORDER — GAUZE BANDAGE 2" X 2"
100 BANDAGE TOPICAL DAILY
Status: DISCONTINUED | OUTPATIENT
Start: 2021-05-10 | End: 2021-05-12 | Stop reason: HOSPADM

## 2021-05-09 RX ORDER — GAUZE BANDAGE 2" X 2"
100 BANDAGE TOPICAL DAILY
Status: DISCONTINUED | OUTPATIENT
Start: 2021-05-10 | End: 2021-05-09

## 2021-05-09 RX ADMIN — DOCUSATE SODIUM 100 MG: 50 LIQUID ORAL at 05:41

## 2021-05-09 RX ADMIN — OXYCODONE 10 MG: 5 TABLET ORAL at 19:49

## 2021-05-09 RX ADMIN — DOCUSATE SODIUM 100 MG: 50 LIQUID ORAL at 16:13

## 2021-05-09 RX ADMIN — THIAMINE HYDROCHLORIDE: 100 INJECTION, SOLUTION INTRAMUSCULAR; INTRAVENOUS at 14:51

## 2021-05-09 RX ADMIN — HYDRALAZINE HYDROCHLORIDE 20 MG: 20 INJECTION INTRAMUSCULAR; INTRAVENOUS at 17:06

## 2021-05-09 RX ADMIN — LABETALOL HYDROCHLORIDE 2.5 MG: 5 INJECTION, SOLUTION INTRAVENOUS at 06:02

## 2021-05-09 RX ADMIN — POLYETHYLENE GLYCOL 3350 1 PACKET: 17 POWDER, FOR SOLUTION ORAL at 05:56

## 2021-05-09 RX ADMIN — ACETAMINOPHEN 650 MG: 325 TABLET, FILM COATED ORAL at 23:06

## 2021-05-09 RX ADMIN — ACETAMINOPHEN 650 MG: 325 TABLET, FILM COATED ORAL at 16:13

## 2021-05-09 RX ADMIN — HYDRALAZINE HYDROCHLORIDE 20 MG: 20 INJECTION INTRAMUSCULAR; INTRAVENOUS at 03:26

## 2021-05-09 RX ADMIN — BISACODYL 10 MG: 10 SUPPOSITORY RECTAL at 00:07

## 2021-05-09 RX ADMIN — OXYCODONE 10 MG: 5 TABLET ORAL at 11:44

## 2021-05-09 RX ADMIN — LEVETIRACETAM 500 MG: 500 TABLET ORAL at 05:41

## 2021-05-09 RX ADMIN — FAMOTIDINE 20 MG: 20 TABLET ORAL at 05:41

## 2021-05-09 RX ADMIN — ENALAPRILAT 1.25 MG: 1.25 INJECTION INTRAVENOUS at 18:03

## 2021-05-09 RX ADMIN — LABETALOL HYDROCHLORIDE 10 MG: 5 INJECTION, SOLUTION INTRAVENOUS at 14:46

## 2021-05-09 RX ADMIN — HYDROMORPHONE HYDROCHLORIDE: 1 INJECTION, SOLUTION INTRAMUSCULAR; INTRAVENOUS; SUBCUTANEOUS at 15:36

## 2021-05-09 RX ADMIN — AMLODIPINE BESYLATE 10 MG: 10 TABLET ORAL at 05:41

## 2021-05-09 RX ADMIN — ACETAMINOPHEN 650 MG: 325 TABLET, FILM COATED ORAL at 05:41

## 2021-05-09 RX ADMIN — LEVETIRACETAM 500 MG: 500 TABLET ORAL at 16:13

## 2021-05-09 RX ADMIN — FAMOTIDINE 20 MG: 20 TABLET ORAL at 16:13

## 2021-05-09 RX ADMIN — OXYCODONE 10 MG: 5 TABLET ORAL at 03:25

## 2021-05-09 RX ADMIN — LABETALOL HYDROCHLORIDE 10 MG: 5 INJECTION, SOLUTION INTRAVENOUS at 02:02

## 2021-05-09 RX ADMIN — ACETAMINOPHEN 650 MG: 325 TABLET, FILM COATED ORAL at 11:45

## 2021-05-09 RX ADMIN — ACETAMINOPHEN 650 MG: 325 TABLET, FILM COATED ORAL at 00:07

## 2021-05-09 ASSESSMENT — PAIN DESCRIPTION - PAIN TYPE
TYPE: ACUTE PAIN

## 2021-05-09 ASSESSMENT — FIBROSIS 4 INDEX: FIB4 SCORE: 3

## 2021-05-09 NOTE — FLOWSHEET NOTE
05/09/21 0800   Spontaneous Breathing Trial (SBT)   Spontaneous breathing trial (SBT) outcome Pt weaned for 1 hour and returned to rest settings per protocol - SBT Pass   Length of Weaning Trial (Hours) 1   Pt passed SBT but not ready to extubate Not ready - continue daily assessments for extubation   Weaning Parameters   RR (bpm) 21   $ FVC / Vital Capacity (liters)    (not following)   Rapid Shallow Breathing Index (RR/VT) 50   Spontaneous VE 8.8   Spontaneous

## 2021-05-09 NOTE — FLOWSHEET NOTE
05/08/21 1530   Spontaneous Breathing Trial (SBT)   Spontaneous breathing trial (SBT) outcome Pt weaned for 1 hour and returned to rest settings per protocol - SBT Pass   Length of Weaning Trial (Hours) 1   Pt passed SBT but not ready to extubate Not ready - continue daily assessments for extubation   Weaning Parameters   RR (bpm) 16   $ FVC / Vital Capacity (liters)    (unable to follow)   Rapid Shallow Breathing Index (RR/VT) 54   Spontaneous VE 5   Spontaneous

## 2021-05-09 NOTE — CARE PLAN
Ventilator Daily Summary     Vent Day # 5     Ventilator settings changed this shift: None     Weaning trials: None     Respiratory Procedures: No     Plan: Continue current ventilator settings and wean mechanical ventilation as tolerated per physician orders.
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
## 2021-05-09 NOTE — CARE PLAN
Ventilator Daily Summary    Vent Day # 4    Ventilator settings: , +8, 30%    Weaning trials: SBT x1hr    Plan: Continue current ventilator settings and wean mechanical ventilation as tolerated per physician orders.

## 2021-05-09 NOTE — CARE PLAN
Problem: Safety  Goal: Will remain free from injury  Outcome: PROGRESSING AS EXPECTED  Goal: Will remain free from falls  Outcome: PROGRESSING AS EXPECTED     Problem: Infection  Goal: Will remain free from infection  Outcome: PROGRESSING AS EXPECTED     Problem: Venous Thromboembolism (VTW)/Deep Vein Thrombosis (DVT) Prevention:  Goal: Patient will participate in Venous Thrombosis (VTE)/Deep Vein Thrombosis (DVT)Prevention Measures  Outcome: PROGRESSING AS EXPECTED     Problem: Respiratory:  Goal: Respiratory status will improve  Outcome: PROGRESSING AS EXPECTED     Problem: Skin Integrity  Goal: Risk for impaired skin integrity will decrease  Outcome: PROGRESSING AS EXPECTED  Note: Wound consult placed to address skin concerns.     Problem: Safety - Medical Restraint  Goal: Remains free of injury from restraints (Restraint for Interference with Medical Device)  Description: INTERVENTIONS:  1. Determine that other, less restrictive measures have been tried or would not be effective before applying the restraint  2. Evaluate the patient's condition at the time of restraint application  3. Inform patient/family regarding the reason for restraint  4. Q2H: Monitor safety, psychosocial status, comfort, nutrition and hydration  Outcome: PROGRESSING AS EXPECTED     Problem: Pain Management  Goal: Pain level will decrease to patient's comfort goal  Outcome: PROGRESSING AS EXPECTED     Problem: Communication  Goal: The ability to communicate needs accurately and effectively will improve  Outcome: PROGRESSING SLOWER THAN EXPECTED  Note: Pt currently unable to communicate needs at this time. Assessing body language and VS to appropriately utilize non-pharm and pharmacological measures to ensure patient comfort and promote healing.     Problem: Bowel/Gastric:  Goal: Normal bowel function is maintained or improved  Outcome: PROGRESSING SLOWER THAN EXPECTED  Goal: Will not experience complications related to bowel  motility  Outcome: PROGRESSING SLOWER THAN EXPECTED  Note: Patient last BM was PTA. Implementing the bowel protocol to ensure patient does not experience complications to bowel motility.     Problem: Safety - Medical Restraint  Goal: Free from restraint(s) (Restraint for Interference with Medical Device)  Description: INTERVENTIONS:  1. ONCE/SHIFT or MINIMUM Q12H: Assess and document the continuing need for restraints  2. Q24H: Continued use of restraint requires LIP to perform face to face examination and written order  3. Identify and implement measures to help patient regain control  Outcome: PROGRESSING SLOWER THAN EXPECTED  Note: Discontinued restraint on right wrist. Pt demonstrating purposeful movements on the left upper extremity towards ETT, left wrist restraint to remain in place.

## 2021-05-10 ENCOUNTER — APPOINTMENT (OUTPATIENT)
Dept: RADIOLOGY | Facility: MEDICAL CENTER | Age: 56
DRG: 004 | End: 2021-05-10
Attending: PHYSICIAN ASSISTANT
Payer: OTHER MISCELLANEOUS

## 2021-05-10 ENCOUNTER — APPOINTMENT (OUTPATIENT)
Dept: RADIOLOGY | Facility: MEDICAL CENTER | Age: 56
DRG: 004 | End: 2021-05-10
Attending: SURGERY
Payer: OTHER MISCELLANEOUS

## 2021-05-10 LAB
ANION GAP SERPL CALC-SCNC: 7 MMOL/L (ref 7–16)
BASE EXCESS BLDA CALC-SCNC: 5 MMOL/L (ref -4–3)
BASE EXCESS BLDA CALC-SCNC: 6 MMOL/L (ref -4–3)
BASOPHILS # BLD AUTO: 0.2 % (ref 0–1.8)
BASOPHILS # BLD: 0.02 K/UL (ref 0–0.12)
BODY TEMPERATURE: ABNORMAL DEGREES
BODY TEMPERATURE: ABNORMAL DEGREES
BUN SERPL-MCNC: 16 MG/DL (ref 8–22)
CALCIUM SERPL-MCNC: 8.5 MG/DL (ref 8.5–10.5)
CHLORIDE SERPL-SCNC: 103 MMOL/L (ref 96–112)
CO2 BLDA-SCNC: 31 MMOL/L (ref 20–33)
CO2 BLDA-SCNC: 32 MMOL/L (ref 20–33)
CO2 SERPL-SCNC: 29 MMOL/L (ref 20–33)
CREAT SERPL-MCNC: 0.41 MG/DL (ref 0.5–1.4)
CRP SERPL HS-MCNC: 7.28 MG/DL (ref 0–0.75)
DELSYS IDSYS: ABNORMAL
DELSYS IDSYS: ABNORMAL
END TIDAL CARBON DIOXIDE IECO2: 41 MMHG
END TIDAL CARBON DIOXIDE IECO2: 42 MMHG
EOSINOPHIL # BLD AUTO: 0.01 K/UL (ref 0–0.51)
EOSINOPHIL NFR BLD: 0.1 % (ref 0–6.9)
ERYTHROCYTE [DISTWIDTH] IN BLOOD BY AUTOMATED COUNT: 46.4 FL (ref 35.9–50)
GLUCOSE SERPL-MCNC: 140 MG/DL (ref 65–99)
HCO3 BLDA-SCNC: 29.8 MMOL/L (ref 17–25)
HCO3 BLDA-SCNC: 30.4 MMOL/L (ref 17–25)
HCT VFR BLD AUTO: 30.6 % (ref 37–47)
HGB BLD-MCNC: 10 G/DL (ref 12–16)
HOROWITZ INDEX BLDA+IHG-RTO: 367 MM[HG]
HOROWITZ INDEX BLDA+IHG-RTO: 383 MM[HG]
IMM GRANULOCYTES # BLD AUTO: 0.08 K/UL (ref 0–0.11)
IMM GRANULOCYTES NFR BLD AUTO: 0.8 % (ref 0–0.9)
LYMPHOCYTES # BLD AUTO: 0.97 K/UL (ref 1–4.8)
LYMPHOCYTES NFR BLD: 9.8 % (ref 22–41)
MAGNESIUM SERPL-MCNC: 1.8 MG/DL (ref 1.5–2.5)
MCH RBC QN AUTO: 32.4 PG (ref 27–33)
MCHC RBC AUTO-ENTMCNC: 32.7 G/DL (ref 33.6–35)
MCV RBC AUTO: 99 FL (ref 81.4–97.8)
MODE IMODE: ABNORMAL
MODE IMODE: ABNORMAL
MONOCYTES # BLD AUTO: 0.92 K/UL (ref 0–0.85)
MONOCYTES NFR BLD AUTO: 9.3 % (ref 0–13.4)
NEUTROPHILS # BLD AUTO: 7.92 K/UL (ref 2–7.15)
NEUTROPHILS NFR BLD: 79.8 % (ref 44–72)
NRBC # BLD AUTO: 0 K/UL
NRBC BLD-RTO: 0 /100 WBC
O2/TOTAL GAS SETTING VFR VENT: 30 %
O2/TOTAL GAS SETTING VFR VENT: 30 %
PCO2 BLDA: 40.9 MMHG (ref 26–37)
PCO2 BLDA: 41.6 MMHG (ref 26–37)
PCO2 TEMP ADJ BLDA: 40.9 MMHG (ref 26–37)
PCO2 TEMP ADJ BLDA: 42.7 MMHG (ref 26–37)
PEEP END EXPIRATORY PRESSURE IPEEP: 8 CMH20
PEEP END EXPIRATORY PRESSURE IPEEP: 8 CMH20
PERCENT MINUTE VOLUME IPMV: 120
PH BLDA: 7.46 [PH] (ref 7.4–7.5)
PH BLDA: 7.48 [PH] (ref 7.4–7.5)
PH TEMP ADJ BLDA: 7.45 [PH] (ref 7.4–7.5)
PH TEMP ADJ BLDA: 7.48 [PH] (ref 7.4–7.5)
PHOSPHATE SERPL-MCNC: 3.9 MG/DL (ref 2.5–4.5)
PLATELET # BLD AUTO: 174 K/UL (ref 164–446)
PMV BLD AUTO: 9.2 FL (ref 9–12.9)
PO2 BLDA: 110 MMHG (ref 64–87)
PO2 BLDA: 115 MMHG (ref 64–87)
PO2 TEMP ADJ BLDA: 110 MMHG (ref 64–87)
PO2 TEMP ADJ BLDA: 119 MMHG (ref 64–87)
POTASSIUM SERPL-SCNC: 3.4 MMOL/L (ref 3.6–5.5)
PREALB SERPL-MCNC: 12.8 MG/DL (ref 18–38)
RBC # BLD AUTO: 3.09 M/UL (ref 4.2–5.4)
SAO2 % BLDA: 99 % (ref 93–99)
SAO2 % BLDA: 99 % (ref 93–99)
SODIUM SERPL-SCNC: 139 MMOL/L (ref 135–145)
SPECIMEN DRAWN FROM PATIENT: ABNORMAL
SPECIMEN DRAWN FROM PATIENT: ABNORMAL
WBC # BLD AUTO: 9.9 K/UL (ref 4.8–10.8)

## 2021-05-10 PROCEDURE — 71045 X-RAY EXAM CHEST 1 VIEW: CPT

## 2021-05-10 PROCEDURE — 700102 HCHG RX REV CODE 250 W/ 637 OVERRIDE(OP): Performed by: SURGERY

## 2021-05-10 PROCEDURE — 84100 ASSAY OF PHOSPHORUS: CPT

## 2021-05-10 PROCEDURE — 94799 UNLISTED PULMONARY SVC/PX: CPT

## 2021-05-10 PROCEDURE — 99291 CRITICAL CARE FIRST HOUR: CPT | Performed by: SURGERY

## 2021-05-10 PROCEDURE — A9270 NON-COVERED ITEM OR SERVICE: HCPCS | Performed by: SURGERY

## 2021-05-10 PROCEDURE — 85025 COMPLETE CBC W/AUTO DIFF WBC: CPT

## 2021-05-10 PROCEDURE — 84134 ASSAY OF PREALBUMIN: CPT

## 2021-05-10 PROCEDURE — 770022 HCHG ROOM/CARE - ICU (200)

## 2021-05-10 PROCEDURE — 73000 X-RAY EXAM OF COLLAR BONE: CPT | Mod: RT

## 2021-05-10 PROCEDURE — 94003 VENT MGMT INPAT SUBQ DAY: CPT

## 2021-05-10 PROCEDURE — 36600 WITHDRAWAL OF ARTERIAL BLOOD: CPT

## 2021-05-10 PROCEDURE — 83735 ASSAY OF MAGNESIUM: CPT

## 2021-05-10 PROCEDURE — 86140 C-REACTIVE PROTEIN: CPT

## 2021-05-10 PROCEDURE — 80048 BASIC METABOLIC PNL TOTAL CA: CPT

## 2021-05-10 PROCEDURE — 82803 BLOOD GASES ANY COMBINATION: CPT | Mod: 91

## 2021-05-10 PROCEDURE — 700111 HCHG RX REV CODE 636 W/ 250 OVERRIDE (IP): Performed by: SURGERY

## 2021-05-10 PROCEDURE — 70551 MRI BRAIN STEM W/O DYE: CPT

## 2021-05-10 RX ORDER — PROPRANOLOL HYDROCHLORIDE 10 MG/1
10 TABLET ORAL EVERY 8 HOURS
Status: DISCONTINUED | OUTPATIENT
Start: 2021-05-10 | End: 2021-05-12 | Stop reason: HOSPADM

## 2021-05-10 RX ORDER — OXYCODONE HYDROCHLORIDE 5 MG/1
5 TABLET ORAL
Status: DISCONTINUED | OUTPATIENT
Start: 2021-05-10 | End: 2021-05-12 | Stop reason: HOSPADM

## 2021-05-10 RX ORDER — HYDROMORPHONE HYDROCHLORIDE 1 MG/ML
0.25 INJECTION, SOLUTION INTRAMUSCULAR; INTRAVENOUS; SUBCUTANEOUS
Status: DISCONTINUED | OUTPATIENT
Start: 2021-05-10 | End: 2021-05-12 | Stop reason: HOSPADM

## 2021-05-10 RX ADMIN — PROPRANOLOL HYDROCHLORIDE 10 MG: 10 TABLET ORAL at 21:07

## 2021-05-10 RX ADMIN — Medication 100 MG: at 10:59

## 2021-05-10 RX ADMIN — ACETAMINOPHEN 650 MG: 325 TABLET, FILM COATED ORAL at 10:59

## 2021-05-10 RX ADMIN — POTASSIUM BICARBONATE 50 MEQ: 978 TABLET, EFFERVESCENT ORAL at 11:37

## 2021-05-10 RX ADMIN — FAMOTIDINE 20 MG: 20 TABLET ORAL at 05:04

## 2021-05-10 RX ADMIN — ACETAMINOPHEN 650 MG: 325 TABLET, FILM COATED ORAL at 05:04

## 2021-05-10 RX ADMIN — DOCUSATE SODIUM 50 MG AND SENNOSIDES 8.6 MG 1 TABLET: 8.6; 5 TABLET, FILM COATED ORAL at 21:06

## 2021-05-10 RX ADMIN — DOCUSATE SODIUM 100 MG: 50 LIQUID ORAL at 18:12

## 2021-05-10 RX ADMIN — LEVETIRACETAM 500 MG: 500 TABLET ORAL at 18:12

## 2021-05-10 RX ADMIN — FOLIC ACID 1 MG: 1 TABLET ORAL at 10:59

## 2021-05-10 RX ADMIN — THERA TABS 1 TABLET: TAB at 10:59

## 2021-05-10 RX ADMIN — PROPRANOLOL HYDROCHLORIDE 10 MG: 10 TABLET ORAL at 13:57

## 2021-05-10 RX ADMIN — AMLODIPINE BESYLATE 10 MG: 10 TABLET ORAL at 05:04

## 2021-05-10 RX ADMIN — POLYETHYLENE GLYCOL 3350 1 PACKET: 17 POWDER, FOR SOLUTION ORAL at 18:12

## 2021-05-10 RX ADMIN — LABETALOL HYDROCHLORIDE 10 MG: 5 INJECTION, SOLUTION INTRAVENOUS at 10:09

## 2021-05-10 RX ADMIN — OXYCODONE 10 MG: 5 TABLET ORAL at 05:04

## 2021-05-10 RX ADMIN — HYDRALAZINE HYDROCHLORIDE 20 MG: 20 INJECTION INTRAMUSCULAR; INTRAVENOUS at 01:10

## 2021-05-10 RX ADMIN — FAMOTIDINE 20 MG: 20 TABLET ORAL at 18:12

## 2021-05-10 RX ADMIN — LABETALOL HYDROCHLORIDE 10 MG: 5 INJECTION, SOLUTION INTRAVENOUS at 21:03

## 2021-05-10 RX ADMIN — LABETALOL HYDROCHLORIDE 10 MG: 5 INJECTION, SOLUTION INTRAVENOUS at 18:12

## 2021-05-10 RX ADMIN — LEVETIRACETAM 500 MG: 500 TABLET ORAL at 05:04

## 2021-05-10 RX ADMIN — OXYCODONE 10 MG: 5 TABLET ORAL at 01:10

## 2021-05-10 ASSESSMENT — PAIN DESCRIPTION - PAIN TYPE
TYPE: ACUTE PAIN

## 2021-05-10 ASSESSMENT — FIBROSIS 4 INDEX: FIB4 SCORE: 2.6

## 2021-05-10 NOTE — FLOWSHEET NOTE
05/10/21 0808   Spontaneous Breathing Trial (SBT)   Spontaneous breathing trial (SBT) outcome Pt weaned for 1 hour and returned to rest settings per protocol - SBT Pass   Length of Weaning Trial (Hours) 1 hour 15 minutes   Pt passed SBT but not ready to extubate Not ready - continue daily assessments for extubation   Weaning Parameters   RR (bpm) 20   $ FVC / Vital Capacity (liters)    (pt not following)   NIF (cm H2O)    (pt not following)   Rapid Shallow Breathing Index (RR/VT) 53   Spontaneous VE 7.7   Spontaneous

## 2021-05-10 NOTE — CARE PLAN
Ventilator Daily Summary    Vent Day # 5    Ventilator settings: , +8, 30%    Weaning trials: SBT x2hr    Plan: Continue current ventilator settings and wean mechanical ventilation as tolerated per physician orders.

## 2021-05-10 NOTE — CARE PLAN
Ventilator Daily Summary     Vent Day # 6     Ventilator settings changed this shift: None     Weaning trials: None     Respiratory Procedures: No     Plan: Continue current ventilator settings and wean mechanical ventilation as tolerated per physician orders.

## 2021-05-10 NOTE — FLOWSHEET NOTE
05/09/21 1630   Spontaneous Breathing Trial (SBT)   Spontaneous breathing trial (SBT) outcome Pt weaned for 1 hour and returned to rest settings per protocol - SBT Pass   Length of Weaning Trial (Hours) 1.5   Pt passed SBT but not ready to extubate Not ready - continue daily assessments for extubation   Weaning Parameters   RR (bpm) 16   $ FVC / Vital Capacity (liters)    (Not following)   Rapid Shallow Breathing Index (RR/VT) 34   Spontaneous VE 6.8   Spontaneous
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
## 2021-05-10 NOTE — PROGRESS NOTES
Neurosurgery Progress Note    Subjective:  PTD#4    Intubated, no sedation currently.  Moving spontaneously. Not following.      Exam:  Intubated.  Moves extremities x4, less to right UE.  Not following commands this morning, but will follow intermittently per nursing.       BP  Min: 113/55  Max: 181/84  Pulse  Av.1  Min: 65  Max: 95  Resp  Av.7  Min: 12  Max: 24  Monitored Temp 2  Av.6 °C (99.7 °F)  Min: 36.8 °C (98.2 °F)  Max: 38 °C (100.4 °F)  SpO2  Av.8 %  Min: 98 %  Max: 100 %    No data recorded    Recent Labs     21  0500 05/10/21  034   WBC 9.4 8.0 9.9   RBC 2.84* 2.97* 3.09*   HEMOGLOBIN 9.2* 9.5* 10.0*   HEMATOCRIT 28.5* 29.8* 30.6*   .4* 100.3* 99.0*   MCH 32.4 32.0 32.4   MCHC 32.3* 31.9* 32.7*   RDW 49.1 47.8 46.4   PLATELETCT 151* 158* 174   MPV 9.5 9.8 9.2     Recent Labs     21  0500 05/10/21  034   SODIUM 139 143 139   POTASSIUM 3.6 3.6 3.4*   CHLORIDE 108 109 103   CO2 27 27 29   GLUCOSE 131* 142* 140*   BUN 15 15 16   CREATININE 0.43* 0.41* 0.41*   CALCIUM 8.2* 8.3* 8.5               Intake/Output       21 - 05/10/21 0659 05/10/21 0700 - 21 0659      [id number] Total 6543-2033 6189-1327 Total       Intake    NG/GT  600  600 1200  100  -- 100    Intake (mL) (Enteral Tube 21 Cortrak - Gastric 10 Fr. Left nare)  100 -- 100    IV Piggyback  1033.3  -- 1033.3  --  -- --    Volume (mL) (detox IV 1000 mL (D5LR + magnesium 1 g + thiamine 100 mg + folic acid 1 mg) infusion) 1033.3 -- 1033.3 -- -- --    Enteral  30  90 120  --  -- --    Free Water / Tube Flush 30 90 120 -- -- --    Total Intake 1663.3 690 2353.3 100 -- 100       Output    Urine  1175  1375 2550  200  -- 200    Output (mL) (Urethral Catheter Coude;Temperature probe 16 Fr.) 1175 1375 2550 200 -- 200    Total Output 1175 1375 2550 200 -- 200       Net I/O     488.3 -685 -196.7 -100 -- -100            Intake/Output Summary (Last 24  hours) at 5/10/2021 0807  Last data filed at 5/10/2021 0800  Gross per 24 hour   Intake 2353.33 ml   Output 2600 ml   Net -246.67 ml            • thiamine  100 mg DAILY    And   • folic acid  1 mg DAILY    And   • multivitamin  1 tablet DAILY   • Pharmacy  1 Each PHARMACY TO DOSE   • acetaminophen  650 mg Q6HRS    Followed by   • acetaminophen  650 mg Q6HRS PRN   • oxyCODONE immediate-release  5 mg Q3HRS PRN    Or   • oxyCODONE immediate-release  10 mg Q3HRS PRN    Or   • HYDROmorphone  0.5 mg Q30 MIN PRN   • docusate sodium  100 mg BID   • famotidine  20 mg BID    Or   • famotidine  20 mg BID   • magnesium hydroxide  30 mL DAILY   • polyethylene glycol/lytes  1 Packet BID   • senna-docusate  1 tablet Nightly   • senna-docusate  1 tablet Q24HRS PRN   • amLODIPine  10 mg Q DAY   • levETIRAcetam  500 mg BID   • enalaprilat  1.25 mg Q6HRS PRN   • hydrALAZINE  20 mg Q4HRS PRN   • Pharmacy Consult Request  1 Each PHARMACY TO DOSE   • bisacodyl  10 mg Q24HRS PRN   • fleet  1 Each Once PRN   • Respiratory Therapy Consult   Continuous RT   • ondansetron  4 mg Q4HRS PRN   • labetalol  10 mg Q4HRS PRN       Assessment and Plan:  Hospital day #6  Continue trauma care.  Ok for q2 hour neuro checks.  Following.   Will obtain MRI of brain to [facility] [place]  Prophylactic anticoagulation: no         Start date/time: TBD

## 2021-05-10 NOTE — CARE PLAN
Ventilator Daily Summary    Vent Day # 6    Ventilator settings changed this shift:Changed ASV % from 120 to 130 based on ABG    Weaning trials: Pt tolerated 2 hours 20 mins of spontaneous today    Respiratory Procedures: N/A    Plan: Continue current ventilator settings and wean mechanical ventilation as tolerated per physician orders.

## 2021-05-10 NOTE — CARE PLAN
Problem: Communication  Goal: The ability to communicate needs accurately and effectively will improve  Outcome: PROGRESSING AS EXPECTED     Problem: Safety  Goal: Will remain free from injury  Outcome: PROGRESSING AS EXPECTED  Goal: Will remain free from falls  Outcome: PROGRESSING AS EXPECTED     Problem: Respiratory:  Goal: Respiratory status will improve  Outcome: PROGRESSING AS EXPECTED     Problem: Skin Integrity  Goal: Risk for impaired skin integrity will decrease  Outcome: PROGRESSING AS EXPECTED     Problem: Safety - Medical Restraint  Goal: Remains free of injury from restraints (Restraint for Interference with Medical Device)  Description: INTERVENTIONS:  1. Determine that other, less restrictive measures have been tried or would not be effective before applying the restraint  2. Evaluate the patient's condition at the time of restraint application  3. Inform patient/family regarding the reason for restraint  4. Q2H: Monitor safety, psychosocial status, comfort, nutrition and hydration  Outcome: PROGRESSING AS EXPECTED  Goal: Free from restraint(s) (Restraint for Interference with Medical Device)  Description: INTERVENTIONS:  1. ONCE/SHIFT or MINIMUM Q12H: Assess and document the continuing need for restraints  2. Q24H: Continued use of restraint requires LIP to perform face to face examination and written order  3. Identify and implement measures to help patient regain control  Outcome: PROGRESSING AS EXPECTED     Problem: Pain Management  Goal: Pain level will decrease to patient's comfort goal  Outcome: PROGRESSING AS EXPECTED

## 2021-05-11 ENCOUNTER — APPOINTMENT (OUTPATIENT)
Dept: RADIOLOGY | Facility: MEDICAL CENTER | Age: 56
DRG: 004 | End: 2021-05-11
Attending: SURGERY
Payer: OTHER MISCELLANEOUS

## 2021-05-11 PROBLEM — R50.9 FEVER: Status: ACTIVE | Noted: 2021-05-11

## 2021-05-11 LAB
AMMONIA PLAS-SCNC: 36 UMOL/L (ref 11–45)
BASE EXCESS BLDA CALC-SCNC: 6 MMOL/L (ref -4–3)
BASE EXCESS BLDA CALC-SCNC: 7 MMOL/L (ref -4–3)
BASOPHILS # BLD AUTO: 0.2 % (ref 0–1.8)
BASOPHILS # BLD: 0.02 K/UL (ref 0–0.12)
BODY TEMPERATURE: ABNORMAL DEGREES
BODY TEMPERATURE: ABNORMAL DEGREES
BREATHS SETTING VENT: 22
CO2 BLDA-SCNC: 30 MMOL/L (ref 20–33)
CO2 BLDA-SCNC: 31 MMOL/L (ref 20–33)
DELSYS IDSYS: ABNORMAL
DELSYS IDSYS: ABNORMAL
END TIDAL CARBON DIOXIDE IECO2: 32 MMHG
END TIDAL CARBON DIOXIDE IECO2: 34 MMHG
EOSINOPHIL # BLD AUTO: 0 K/UL (ref 0–0.51)
EOSINOPHIL NFR BLD: 0 % (ref 0–6.9)
ERYTHROCYTE [DISTWIDTH] IN BLOOD BY AUTOMATED COUNT: 44.9 FL (ref 35.9–50)
HCO3 BLDA-SCNC: 28.9 MMOL/L (ref 17–25)
HCO3 BLDA-SCNC: 30.4 MMOL/L (ref 17–25)
HCT VFR BLD AUTO: 35.3 % (ref 37–47)
HGB BLD-MCNC: 11.5 G/DL (ref 12–16)
HOROWITZ INDEX BLDA+IHG-RTO: 232 MM[HG]
HOROWITZ INDEX BLDA+IHG-RTO: 70 MM[HG]
IMM GRANULOCYTES # BLD AUTO: 0.08 K/UL (ref 0–0.11)
IMM GRANULOCYTES NFR BLD AUTO: 0.6 % (ref 0–0.9)
LYMPHOCYTES # BLD AUTO: 0.73 K/UL (ref 1–4.8)
LYMPHOCYTES NFR BLD: 5.8 % (ref 22–41)
MCH RBC QN AUTO: 31.9 PG (ref 27–33)
MCHC RBC AUTO-ENTMCNC: 32.6 G/DL (ref 33.6–35)
MCV RBC AUTO: 98.1 FL (ref 81.4–97.8)
MODE IMODE: ABNORMAL
MODE IMODE: ABNORMAL
MONOCYTES # BLD AUTO: 0.94 K/UL (ref 0–0.85)
MONOCYTES NFR BLD AUTO: 7.5 % (ref 0–13.4)
MRSA DNA SPEC QL NAA+PROBE: NORMAL
NEUTROPHILS # BLD AUTO: 10.71 K/UL (ref 2–7.15)
NEUTROPHILS NFR BLD: 85.9 % (ref 44–72)
NRBC # BLD AUTO: 0 K/UL
NRBC BLD-RTO: 0 /100 WBC
O2/TOTAL GAS SETTING VFR VENT: 100 %
O2/TOTAL GAS SETTING VFR VENT: 90 %
PCO2 BLDA: 36.7 MMHG (ref 26–37)
PCO2 BLDA: 37.7 MMHG (ref 26–37)
PCO2 TEMP ADJ BLDA: 37.7 MMHG (ref 26–37)
PCO2 TEMP ADJ BLDA: 40.1 MMHG (ref 26–37)
PEEP END EXPIRATORY PRESSURE IPEEP: 12 CMH20
PEEP END EXPIRATORY PRESSURE IPEEP: 14 CMH20
PERCENT MINUTE VOLUME IPMV: 130
PH BLDA: 7.5 [PH] (ref 7.4–7.5)
PH BLDA: 7.51 [PH] (ref 7.4–7.5)
PH TEMP ADJ BLDA: 7.47 [PH] (ref 7.4–7.5)
PH TEMP ADJ BLDA: 7.51 [PH] (ref 7.4–7.5)
PLATELET # BLD AUTO: 206 K/UL (ref 164–446)
PMV BLD AUTO: 9.4 FL (ref 9–12.9)
PO2 BLDA: 209 MMHG (ref 64–87)
PO2 BLDA: 70 MMHG (ref 64–87)
PO2 TEMP ADJ BLDA: 209 MMHG (ref 64–87)
PO2 TEMP ADJ BLDA: 80 MMHG (ref 64–87)
RBC # BLD AUTO: 3.6 M/UL (ref 4.2–5.4)
SAO2 % BLDA: 100 % (ref 93–99)
SAO2 % BLDA: 95 % (ref 93–99)
SIGNIFICANT IND 70042: NORMAL
SITE SITE: NORMAL
SOURCE SOURCE: NORMAL
SPECIMEN DRAWN FROM PATIENT: ABNORMAL
SPECIMEN DRAWN FROM PATIENT: ABNORMAL
TIDAL VOLUME IVT: 350 ML
WBC # BLD AUTO: 12.5 K/UL (ref 4.8–10.8)

## 2021-05-11 PROCEDURE — 31624 DX BRONCHOSCOPE/LAVAGE: CPT | Performed by: SURGERY

## 2021-05-11 PROCEDURE — A9270 NON-COVERED ITEM OR SERVICE: HCPCS | Performed by: SURGERY

## 2021-05-11 PROCEDURE — 87641 MR-STAPH DNA AMP PROBE: CPT

## 2021-05-11 PROCEDURE — 700111 HCHG RX REV CODE 636 W/ 250 OVERRIDE (IP)

## 2021-05-11 PROCEDURE — 700111 HCHG RX REV CODE 636 W/ 250 OVERRIDE (IP): Performed by: SURGERY

## 2021-05-11 PROCEDURE — 85025 COMPLETE CBC W/AUTO DIFF WBC: CPT

## 2021-05-11 PROCEDURE — 302132 K THERMIA MOTOR: Performed by: SURGERY

## 2021-05-11 PROCEDURE — 99152 MOD SED SAME PHYS/QHP 5/>YRS: CPT

## 2021-05-11 PROCEDURE — 31600 PLANNED TRACHEOSTOMY: CPT | Performed by: SURGERY

## 2021-05-11 PROCEDURE — 87040 BLOOD CULTURE FOR BACTERIA: CPT

## 2021-05-11 PROCEDURE — 700105 HCHG RX REV CODE 258: Performed by: SURGERY

## 2021-05-11 PROCEDURE — 36600 WITHDRAWAL OF ARTERIAL BLOOD: CPT

## 2021-05-11 PROCEDURE — 0B9J8ZX DRAINAGE OF LEFT LOWER LUNG LOBE, VIA NATURAL OR ARTIFICIAL OPENING ENDOSCOPIC, DIAGNOSTIC: ICD-10-PCS | Performed by: SURGERY

## 2021-05-11 PROCEDURE — 87070 CULTURE OTHR SPECIMN AEROBIC: CPT

## 2021-05-11 PROCEDURE — 94799 UNLISTED PULMONARY SVC/PX: CPT

## 2021-05-11 PROCEDURE — 770022 HCHG ROOM/CARE - ICU (200)

## 2021-05-11 PROCEDURE — 31600 PLANNED TRACHEOSTOMY: CPT

## 2021-05-11 PROCEDURE — 700101 HCHG RX REV CODE 250: Performed by: SURGERY

## 2021-05-11 PROCEDURE — 94003 VENT MGMT INPAT SUBQ DAY: CPT

## 2021-05-11 PROCEDURE — 87205 SMEAR GRAM STAIN: CPT

## 2021-05-11 PROCEDURE — 302978 HCHG BRONCHOSCOPY-DIAGNOSTIC

## 2021-05-11 PROCEDURE — 700102 HCHG RX REV CODE 250 W/ 637 OVERRIDE(OP): Performed by: SURGERY

## 2021-05-11 PROCEDURE — 82140 ASSAY OF AMMONIA: CPT

## 2021-05-11 PROCEDURE — 31645 BRNCHSC W/THER ASPIR 1ST: CPT | Performed by: SURGERY

## 2021-05-11 PROCEDURE — 71045 X-RAY EXAM CHEST 1 VIEW: CPT

## 2021-05-11 PROCEDURE — 0B113F4 BYPASS TRACHEA TO CUTANEOUS WITH TRACHEOSTOMY DEVICE, PERCUTANEOUS APPROACH: ICD-10-PCS | Performed by: EMERGENCY MEDICINE

## 2021-05-11 PROCEDURE — 99291 CRITICAL CARE FIRST HOUR: CPT | Mod: 25 | Performed by: SURGERY

## 2021-05-11 PROCEDURE — 82803 BLOOD GASES ANY COMBINATION: CPT | Mod: 91

## 2021-05-11 RX ORDER — ROCURONIUM BROMIDE 10 MG/ML
50 INJECTION, SOLUTION INTRAVENOUS ONCE
Status: COMPLETED | OUTPATIENT
Start: 2021-05-11 | End: 2021-05-11

## 2021-05-11 RX ORDER — ACETAMINOPHEN 325 MG/1
650 TABLET ORAL EVERY 6 HOURS
Status: DISCONTINUED | OUTPATIENT
Start: 2021-05-11 | End: 2021-05-12 | Stop reason: HOSPADM

## 2021-05-11 RX ORDER — PROPOFOL 10 MG/ML
0-100 INJECTION, EMULSION INTRAVENOUS ONCE
Status: COMPLETED | OUTPATIENT
Start: 2021-05-11 | End: 2021-05-11

## 2021-05-11 RX ADMIN — THERA TABS 1 TABLET: TAB at 05:07

## 2021-05-11 RX ADMIN — FOLIC ACID 1 MG: 1 TABLET ORAL at 05:07

## 2021-05-11 RX ADMIN — VANCOMYCIN HYDROCHLORIDE 1000 MG: 500 INJECTION, POWDER, LYOPHILIZED, FOR SOLUTION INTRAVENOUS at 23:17

## 2021-05-11 RX ADMIN — ROCURONIUM BROMIDE 50 MG: 10 INJECTION, SOLUTION INTRAVENOUS at 13:34

## 2021-05-11 RX ADMIN — LEVETIRACETAM 500 MG: 500 TABLET ORAL at 05:06

## 2021-05-11 RX ADMIN — FENTANYL CITRATE 100 MCG: 50 INJECTION, SOLUTION INTRAMUSCULAR; INTRAVENOUS at 13:34

## 2021-05-11 RX ADMIN — VANCOMYCIN HYDROCHLORIDE 1500 MG: 500 INJECTION, POWDER, LYOPHILIZED, FOR SOLUTION INTRAVENOUS at 16:07

## 2021-05-11 RX ADMIN — DOCUSATE SODIUM 100 MG: 50 LIQUID ORAL at 05:06

## 2021-05-11 RX ADMIN — PROPOFOL 50 MG: 10 INJECTION, EMULSION INTRAVENOUS at 13:34

## 2021-05-11 RX ADMIN — AMLODIPINE BESYLATE 10 MG: 10 TABLET ORAL at 05:07

## 2021-05-11 RX ADMIN — HYDRALAZINE HYDROCHLORIDE 20 MG: 20 INJECTION INTRAMUSCULAR; INTRAVENOUS at 04:06

## 2021-05-11 RX ADMIN — POLYETHYLENE GLYCOL 3350 1 PACKET: 17 POWDER, FOR SOLUTION ORAL at 05:07

## 2021-05-11 RX ADMIN — POLYETHYLENE GLYCOL 3350 1 PACKET: 17 POWDER, FOR SOLUTION ORAL at 17:24

## 2021-05-11 RX ADMIN — MAGNESIUM HYDROXIDE 30 ML: 400 SUSPENSION ORAL at 05:06

## 2021-05-11 RX ADMIN — Medication 100 MG: at 05:06

## 2021-05-11 RX ADMIN — PROPRANOLOL HYDROCHLORIDE 10 MG: 10 TABLET ORAL at 05:07

## 2021-05-11 RX ADMIN — OXYCODONE 5 MG: 5 TABLET ORAL at 00:44

## 2021-05-11 RX ADMIN — DOCUSATE SODIUM 100 MG: 50 LIQUID ORAL at 17:24

## 2021-05-11 RX ADMIN — LEVETIRACETAM 500 MG: 500 TABLET ORAL at 17:24

## 2021-05-11 RX ADMIN — PROPRANOLOL HYDROCHLORIDE 10 MG: 10 TABLET ORAL at 22:03

## 2021-05-11 RX ADMIN — ACETAMINOPHEN 650 MG: 325 TABLET, FILM COATED ORAL at 17:24

## 2021-05-11 RX ADMIN — FAMOTIDINE 20 MG: 20 TABLET ORAL at 05:07

## 2021-05-11 RX ADMIN — PROPRANOLOL HYDROCHLORIDE 10 MG: 10 TABLET ORAL at 14:45

## 2021-05-11 RX ADMIN — CEFEPIME 2 G: 2 INJECTION, POWDER, FOR SOLUTION INTRAVENOUS at 15:01

## 2021-05-11 RX ADMIN — ACETAMINOPHEN 650 MG: 325 TABLET, FILM COATED ORAL at 23:17

## 2021-05-11 RX ADMIN — FAMOTIDINE 20 MG: 20 TABLET ORAL at 17:24

## 2021-05-11 RX ADMIN — ACETAMINOPHEN 650 MG: 325 TABLET, FILM COATED ORAL at 12:47

## 2021-05-11 ASSESSMENT — PAIN DESCRIPTION - PAIN TYPE: TYPE: ACUTE PAIN

## 2021-05-11 ASSESSMENT — FIBROSIS 4 INDEX: FIB4 SCORE: 2.2
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
## 2021-05-11 NOTE — CARE PLAN
Problem: Safety  Goal: Will remain free from injury  Outcome: PROGRESSING AS EXPECTED  Goal: Will remain free from falls  Outcome: PROGRESSING AS EXPECTED     Problem: Skin Integrity  Goal: Risk for impaired skin integrity will decrease  Outcome: PROGRESSING AS EXPECTED     Problem: Safety - Medical Restraint  Goal: Remains free of injury from restraints (Restraint for Interference with Medical Device)  Description: INTERVENTIONS:  1. Determine that other, less restrictive measures have been tried or would not be effective before applying the restraint  2. Evaluate the patient's condition at the time of restraint application  3. Inform patient/family regarding the reason for restraint  4. Q2H: Monitor safety, psychosocial status, comfort, nutrition and hydration  Outcome: PROGRESSING AS EXPECTED  Goal: Free from restraint(s) (Restraint for Interference with Medical Device)  Description: INTERVENTIONS:  1. ONCE/SHIFT or MINIMUM Q12H: Assess and document the continuing need for restraints  2. Q24H: Continued use of restraint requires LIP to perform face to face examination and written order  3. Identify and implement measures to help patient regain control  Outcome: PROGRESSING AS EXPECTED     Problem: Pain Management  Goal: Pain level will decrease to patient's comfort goal  Outcome: PROGRESSING AS EXPECTED     Problem: Respiratory:  Goal: Respiratory status will improve  Outcome: PROGRESSING SLOWER THAN EXPECTED
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
## 2021-05-11 NOTE — ASSESSMENT & PLAN NOTE
5/11 Intermittent temp spikes associated with purulent secretions and increasing oxygen requirements  Bronchoscopy with bronchoalveolar lavage cultures, blood cultures   Empiric antibiotics darted: Cefepime and vancomycin

## 2021-05-11 NOTE — PROGRESS NOTES
"/71   Pulse 89   Temp 37.3 °C (99.2 °F) (Bladder)   Resp (!) 27   Ht 1.778 m (5' 10\")   Wt 64.6 kg (142 lb 6.7 oz)   SpO2 94%     Dr. Blair would like to be notified on plan of care after family discussion.   Re: open clavicle fracture.  "

## 2021-05-11 NOTE — CARE PLAN
Ventilator Daily Summary     Vent Day # 7     Ventilator settings changed this shift: Increased FiO2 to 100% and PEEP to 14.      Weaning trials: None     Respiratory Procedures: No     Plan: Continue current ventilator settings and wean mechanical ventilation as tolerated per physician orders.

## 2021-05-11 NOTE — CARE PLAN
Pt unable to express needs, will continue to monitor    Pt remains free of any falls during this hospital stay

## 2021-05-11 NOTE — PROGRESS NOTES
Neurosurgery Progress Note    Subjective:  PTD#5    Intubated  MRI with significant shear and diffuse SAH and cortical contusions      Exam:  Intubated.  Moves extremities x4  Right pupil 4mm, left pupil 3mm; seems to be stable from presentation  Not following commands   Significant edema/contusion to periorbital region        BP  Min: 128/69  Max: 172/84  Pulse  Av.3  Min: 72  Max: 92  Resp  Av  Min: 4  Max: 34  Monitored Temp 2  Av.4 °C (101.1 °F)  Min: 37.9 °C (100.2 °F)  Max: 39 °C (102.2 °F)  SpO2  Av.3 %  Min: 90 %  Max: 99 %    No data recorded    Recent Labs     21  0500 05/10/21  03421  0415   WBC 8.0 9.9 12.5*   RBC 2.97* 3.09* 3.60*   HEMOGLOBIN 9.5* 10.0* 11.5*   HEMATOCRIT 29.8* 30.6* 35.3*   .3* 99.0* 98.1*   MCH 32.0 32.4 31.9   MCHC 31.9* 32.7* 32.6*   RDW 47.8 46.4 44.9   PLATELETCT 158* 174 206   MPV 9.8 9.2 9.4     Recent Labs     21  0500 05/10/21  0342   SODIUM 143 139   POTASSIUM 3.6 3.4*   CHLORIDE 109 103   CO2 27 29   GLUCOSE 142* 140*   BUN 15 16   CREATININE 0.41* 0.41*   CALCIUM 8.3* 8.5               Intake/Output       05/10/21 0700 - 21 0659 21 - 21 0659       5846-5252 Total 1900-0659 Total       Intake    NG/GT  400  600 1000  --  -- --    Intake (mL) (Enteral Tube 21 Cortrak - Gastric 10 Fr. Left nare)  -- -- --    Enteral  90  90 180  --  -- --    Free Water / Tube Flush 90 90 180 -- -- --    Total Intake  -- -- --       Output    Urine  1550  1600 3150  --  -- --    Output (mL) (Urethral Catheter Coude;Temperature probe 16 Fr.) 1550 1600 3150 -- -- --    Total Output 1550 1600 3150 -- -- --       Net I/O     -1060 -910  -- -- --            Intake/Output Summary (Last 24 hours) at 2021 0836  Last data filed at 2021 0606  Gross per 24 hour   Intake 1050 ml   Output 2950 ml   Net -1900 ml            • propranolol  10 mg Q8HRS   • HYDROmorphone  0.25 mg  Q30 MIN PRN    Or   • oxyCODONE immediate-release  5 mg Q3HRS PRN   • thiamine  100 mg DAILY    And   • folic acid  1 mg DAILY    And   • multivitamin  1 tablet DAILY   • Pharmacy  1 Each PHARMACY TO DOSE   • docusate sodium  100 mg BID   • famotidine  20 mg BID    Or   • famotidine  20 mg BID   • magnesium hydroxide  30 mL DAILY   • polyethylene glycol/lytes  1 Packet BID   • senna-docusate  1 tablet Nightly   • senna-docusate  1 tablet Q24HRS PRN   • amLODIPine  10 mg Q DAY   • levETIRAcetam  500 mg BID   • enalaprilat  1.25 mg Q6HRS PRN   • hydrALAZINE  20 mg Q4HRS PRN   • Pharmacy Consult Request  1 Each PHARMACY TO DOSE   • bisacodyl  10 mg Q24HRS PRN   • fleet  1 Each Once PRN   • Respiratory Therapy Consult   Continuous RT   • ondansetron  4 mg Q4HRS PRN   • labetalol  10 mg Q4HRS PRN       Assessment and Plan:  Hospital day #7  Continue trauma care.  Ok for q2 hour neuro checks.  [place]  Continue Keppra  Prophylactic anticoagulation: no         Start date/time: TBD

## 2021-05-11 NOTE — DISCHARGE PLANNING
Care Transition Team Assessment    In the case of an emergency, pt's legal NOK is Thomas Lombardi [phone]    LSW attended the care conference with Dr. Jordan and bedside RN. Overall family wants to continue with treatment with hopes of getting patient to an LTAC once appropriate. MD discussed trach & peg which spouse was agreeable on. LSW provided family with an update of role as SW and will work with them on discharge planning that align with pt's needs and family's wants.     Patient has a a commercial plan ([employer]) with a [employer] plan. Further research into plan will be conducted.     Plan: Continue to assist family with goals of care & dc. Search pt's insurance coverage/benefits more in depth.    Information Source  Orientation Level: Unable to assess  Information Given By: Spouse  Informant's Name: Thomas Lombardi  Who is responsible for making decisions for patient? : Legal next of kin  Name(s) of Primary Decision Maker: spouse    Readmission Evaluation  Is this a readmission?: No    Elopement Risk  Legal Hold: No  Ambulatory or Self Mobile in Wheelchair: No-Not an Elopement Risk  Elopement Risk: Not at Risk for Elopement    Discharge Preparedness  What is your plan after discharge?: Uncertain - pending medical team collaboration  What are your discharge supports?: Child, Spouse  Prior Functional Level: Ambulatory, Drives Self, Independent with Activities of Daily Living, Independent with Medication Management    Functional Assesment  Prior Functional Level: Ambulatory, Drives Self, Independent with Activities of Daily Living, Independent with Medication Management    Finances  Financial Barriers to Discharge: No  Prescription Coverage: Yes    Vision / Hearing Impairment  Right Eye Vision: Other (Comments)  Left Eye Vision: Other (Comments)  Hearing Impairment: (SANCHEZ)    Advance Directive  Advance Directive?: Clinically incapacitated / Inappropriate    Psychological Assessment  History of Substance Abuse:  None  History of Psychiatric Problems: No  Non-compliant with Treatment: No  Newly Diagnosed Illness: Yes    Discharge Risks or Barriers  Discharge risks or barriers?: Complex medical needs    Anticipated Discharge Information  Discharge Disposition: Still a Patient (30)

## 2021-05-11 NOTE — PROGRESS NOTES
"Seen and examined this AM.  Minimal improvement of mental status since admission per SICU notes, severe brain injury.    /71   Pulse 89   Temp 37.3 °C (99.2 °F) (Bladder)   Resp (!) 27   Ht 1.778 m (5' 10\")   Wt 64.6 kg (142 lb 6.7 oz)   SpO2 94%     Exam:  R shoulder wound c/d/i, no sign of infection    #1 Right open comminuted clavicle fracture    Plan:  - Possible OR for I&D and ORIF this Thursday if stable and if meaningful neuro recovery expected  "

## 2021-05-11 NOTE — PROCEDURES
DATE OF OPERATION: 5/11/2021     PREOPERATIVE DIAGNOSIS: purulent secretions, leukocytosis, increase in oxygen requirement and tracheostomy.    POSTOPERATIVE DIAGNOSIS: purulent secretions, leukocytosis, increase in oxygen requirement and tracheostomy.    PROCEDURE PERFORMED: Diagnostic flexible fiberoptic bronchoscopy with bronchoalveolar lavage. Therapeutic flexible fiberoptic bronchoscopy with direct visualization for percutaneous tracheostomy.    SURGEON: Julian Hess M.D.    ANESTHESIA: Anesthesia consisting of local anesthesia, intravenous sedation, parenteral analgesia and pharmacologic restraint was administered.    INDICATIONS: The patient is a 55 year-old woman with acute respiratory failure, purulent secretions, leukocytosis, increase in oxygen requirement and tracheostomy. Diagnostic and therapeutic flexible fiberoptic bronchoscopy with direct visualization for percutaneous tracheostomy. is performed in the ICU.    FINDINGS:   Normal anatomy.  Purulent Secretions: Trauma Bronchoscopy Location Detailed: Left lower lobe, lateral basal (B7 + B8) and posterior basal (B9) segments..    SPECIMEN: Bronchoalveolar lavage for quantitative culture. BAL location left inferior lobe.    PROCEDURE: Following informed consent consent, the patient was properly identified and optimally positioned in bed. She was preoxygenated with 100% oxygen and placed on a regular ventilatory rate. Anesthesia consisting of local anesthesia, intravenous sedation, parenteral analgesia and pharmacologic restraint was administered.    The fiberoptic bronchoscope was advanced through the endotracheal tube. Diagnostic bronchoscopy with bronchoalveolar lavage was performed. All airways were evaluated to the sub-segmental level. The airways were systematically and sequentially inspected and lavaged with sterile saline using a wedged alveolar technique.  Diagnostic specimens were obtained from the left inferior lobe. The pooled specimen  effluent was collected in a sterile specimen trap and submitted to the laboratory for quantitative cultures.  Additional therapeutic bronchoscopy with suctioning and washing of the airways was carried out in the left inferior lobe. Final inspection following therapeutic lavage demonstrated clear airways with no residual mucous, fluid, or debris.      The bronchoscope was positioned in the right lower lobe segment and the endotracheal tube was withdrawn until the balloon was above the cords.  The scope was withdrawn into the endotracheal tube.  The needle was visualized just distal to the endotracheal tube and the wire was seen going distally.  Percutaneous tracheostomy then performed by Dr. Jordan.  Once the tracheostomy tube is in position was connected to the ventilator circuit.  The scope was advanced through the tracheostomy tube into the airway.  Tube the tracheostomy tube was in good position and there are no additional secretions noted.    The patient tolerated the procedure well. There were no apparent complications.           ____________________________________     Julian Hess M.D.    DD: 5/11/2021  2:18 PM

## 2021-05-11 NOTE — CARE PLAN
Ventilator Daily Summary    Vent Day # 7    Ventilator settings changed this shift: Change from ASV to APCMV RR20 Vt 350 Peep of 12 Fi02 70. Changed perameters to control pts C02    Weaning trials: Not weaning at this time    Respiratory Procedures: trached with an 8.0 portex. Bronchoscopy    Plan: Continue current ventilator settings and wean mechanical ventilation as tolerated per physician orders.
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
## 2021-05-12 VITALS
DIASTOLIC BLOOD PRESSURE: 66 MMHG | BODY MASS INDEX: 21.75 KG/M2 | OXYGEN SATURATION: 97 % | RESPIRATION RATE: 26 BRPM | WEIGHT: 151.9 LBS | HEIGHT: 70 IN | TEMPERATURE: 99.2 F | SYSTOLIC BLOOD PRESSURE: 135 MMHG | HEART RATE: 80 BPM

## 2021-05-12 LAB
ANION GAP SERPL CALC-SCNC: 6 MMOL/L (ref 7–16)
BASOPHILS # BLD AUTO: 0 % (ref 0–1.8)
BASOPHILS # BLD: 0 K/UL (ref 0–0.12)
BUN SERPL-MCNC: 23 MG/DL (ref 8–22)
CALCIUM SERPL-MCNC: 8.6 MG/DL (ref 8.5–10.5)
CHLORIDE SERPL-SCNC: 106 MMOL/L (ref 96–112)
CO2 SERPL-SCNC: 30 MMOL/L (ref 20–33)
CREAT SERPL-MCNC: 0.33 MG/DL (ref 0.5–1.4)
EOSINOPHIL # BLD AUTO: 0 K/UL (ref 0–0.51)
EOSINOPHIL NFR BLD: 0 % (ref 0–6.9)
ERYTHROCYTE [DISTWIDTH] IN BLOOD BY AUTOMATED COUNT: 45.3 FL (ref 35.9–50)
GLUCOSE BLD-MCNC: 115 MG/DL (ref 65–99)
GLUCOSE SERPL-MCNC: 139 MG/DL (ref 65–99)
GRAM STN SPEC: NORMAL
HCT VFR BLD AUTO: 31.4 % (ref 37–47)
HGB BLD-MCNC: 10.3 G/DL (ref 12–16)
LYMPHOCYTES # BLD AUTO: 0.88 K/UL (ref 1–4.8)
LYMPHOCYTES NFR BLD: 7 % (ref 22–41)
MANUAL DIFF BLD: NORMAL
MCH RBC QN AUTO: 32.4 PG (ref 27–33)
MCHC RBC AUTO-ENTMCNC: 32.8 G/DL (ref 33.6–35)
MCV RBC AUTO: 98.7 FL (ref 81.4–97.8)
MONOCYTES # BLD AUTO: 0.44 K/UL (ref 0–0.85)
MONOCYTES NFR BLD AUTO: 3.5 % (ref 0–13.4)
MORPHOLOGY BLD-IMP: NORMAL
MYELOCYTES NFR BLD MANUAL: 0.9 %
NEUTROPHILS # BLD AUTO: 11.08 K/UL (ref 2–7.15)
NEUTROPHILS NFR BLD: 87.7 % (ref 44–72)
NEUTS BAND NFR BLD MANUAL: 0.9 % (ref 0–10)
NRBC # BLD AUTO: 0 K/UL
NRBC BLD-RTO: 0 /100 WBC
PLATELET # BLD AUTO: 197 K/UL (ref 164–446)
PLATELET BLD QL SMEAR: NORMAL
PMV BLD AUTO: 9.8 FL (ref 9–12.9)
POTASSIUM SERPL-SCNC: 3.4 MMOL/L (ref 3.6–5.5)
RBC # BLD AUTO: 3.18 M/UL (ref 4.2–5.4)
SIGNIFICANT IND 70042: NORMAL
SITE SITE: NORMAL
SODIUM SERPL-SCNC: 142 MMOL/L (ref 135–145)
SOURCE SOURCE: NORMAL
WBC # BLD AUTO: 12.5 K/UL (ref 4.8–10.8)

## 2021-05-12 PROCEDURE — 80048 BASIC METABOLIC PNL TOTAL CA: CPT

## 2021-05-12 PROCEDURE — A9270 NON-COVERED ITEM OR SERVICE: HCPCS | Performed by: SURGERY

## 2021-05-12 PROCEDURE — 700105 HCHG RX REV CODE 258: Performed by: SURGERY

## 2021-05-12 PROCEDURE — 700102 HCHG RX REV CODE 250 W/ 637 OVERRIDE(OP): Performed by: SURGERY

## 2021-05-12 PROCEDURE — L4398 FOOT DROP SPLINT PRE OTS: HCPCS

## 2021-05-12 PROCEDURE — 71045 X-RAY EXAM CHEST 1 VIEW: CPT

## 2021-05-12 PROCEDURE — 94799 UNLISTED PULMONARY SVC/PX: CPT

## 2021-05-12 PROCEDURE — 700111 HCHG RX REV CODE 636 W/ 250 OVERRIDE (IP): Performed by: SURGERY

## 2021-05-12 PROCEDURE — 85007 BL SMEAR W/DIFF WBC COUNT: CPT

## 2021-05-12 PROCEDURE — 94150 VITAL CAPACITY TEST: CPT

## 2021-05-12 PROCEDURE — 85027 COMPLETE CBC AUTOMATED: CPT

## 2021-05-12 PROCEDURE — 94003 VENT MGMT INPAT SUBQ DAY: CPT

## 2021-05-12 PROCEDURE — 99291 CRITICAL CARE FIRST HOUR: CPT | Performed by: SURGERY

## 2021-05-12 PROCEDURE — 82962 GLUCOSE BLOOD TEST: CPT

## 2021-05-12 RX ADMIN — ACETAMINOPHEN 650 MG: 325 TABLET, FILM COATED ORAL at 11:59

## 2021-05-12 RX ADMIN — AMLODIPINE BESYLATE 10 MG: 10 TABLET ORAL at 05:10

## 2021-05-12 RX ADMIN — Medication 100 MG: at 05:10

## 2021-05-12 RX ADMIN — LEVETIRACETAM 500 MG: 500 TABLET ORAL at 05:10

## 2021-05-12 RX ADMIN — PROPRANOLOL HYDROCHLORIDE 10 MG: 10 TABLET ORAL at 15:00

## 2021-05-12 RX ADMIN — THERA TABS 1 TABLET: TAB at 05:11

## 2021-05-12 RX ADMIN — ACETAMINOPHEN 650 MG: 325 TABLET, FILM COATED ORAL at 05:11

## 2021-05-12 RX ADMIN — POTASSIUM BICARBONATE 50 MEQ: 978 TABLET, EFFERVESCENT ORAL at 09:59

## 2021-05-12 RX ADMIN — POTASSIUM BICARBONATE 50 MEQ: 978 TABLET, EFFERVESCENT ORAL at 21:09

## 2021-05-12 RX ADMIN — VANCOMYCIN HYDROCHLORIDE 1000 MG: 500 INJECTION, POWDER, LYOPHILIZED, FOR SOLUTION INTRAVENOUS at 06:28

## 2021-05-12 RX ADMIN — FAMOTIDINE 20 MG: 20 TABLET ORAL at 05:10

## 2021-05-12 RX ADMIN — CEFEPIME 2 G: 2 INJECTION, POWDER, FOR SOLUTION INTRAVENOUS at 05:11

## 2021-05-12 RX ADMIN — PROPRANOLOL HYDROCHLORIDE 10 MG: 10 TABLET ORAL at 05:10

## 2021-05-12 RX ADMIN — FAMOTIDINE 20 MG: 20 TABLET ORAL at 17:29

## 2021-05-12 RX ADMIN — FOLIC ACID 1 MG: 1 TABLET ORAL at 05:10

## 2021-05-12 RX ADMIN — ACETAMINOPHEN 650 MG: 325 TABLET, FILM COATED ORAL at 17:29

## 2021-05-12 RX ADMIN — CEFEPIME 2 G: 2 INJECTION, POWDER, FOR SOLUTION INTRAVENOUS at 17:31

## 2021-05-12 ASSESSMENT — FIBROSIS 4 INDEX: FIB4 SCORE: 2.3

## 2021-05-12 ASSESSMENT — PULMONARY FUNCTION TESTS
FVC: 0
FVC: 0

## 2021-05-12 ASSESSMENT — PAIN DESCRIPTION - PAIN TYPE: TYPE: ACUTE PAIN

## 2021-05-12 NOTE — DISCHARGE PLANNING
LSW discussed case with unit manager. Was informed spouse is agreeable with paying out of pocket cost for transport if needed. This information was forwarded to RTOC team.     RTOC actively working on coordinating transfer. RTOC RN Sakshi HARE, requested LSW to start COBRA which has been done and is in unit case management office if patient transfers over night.     Plan: Follow up with RTOC regarding possible acute to acute transfer
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
## 2021-05-12 NOTE — CARE PLAN
Problem: Safety  Goal: Will remain free from injury  Outcome: PROGRESSING AS EXPECTED  Goal: Will remain free from falls  Outcome: PROGRESSING AS EXPECTED  Educated on fall risk and interventions In place      Problem: Infection  Goal: Will remain free from infection  Outcome: PROGRESSING AS EXPECTED  Educated on infection prevention and interventions in place     Problem: Venous Thromboembolism (VTW)/Deep Vein Thrombosis (DVT) Prevention:  Goal: Patient will participate in Venous Thrombosis (VTE)/Deep Vein Thrombosis (DVT)Prevention Measures  Outcome: PROGRESSING AS EXPECTED  Educated about interventions in place.

## 2021-05-12 NOTE — PROGRESS NOTES
Discussed case with Dr. Jordan.  There are no plans for treatment of the patients orbital fractures at this time  When the patient can cooperate with an exam there may be clinical indication for treatment but at this time without an exam there is no indication for surgery.

## 2021-05-12 NOTE — CARE PLAN
Ventilator Daily Summary    Vent Day #  V7 T2  Ventilator settings changed this shift:  PEEP to 8 FiO2 to 30   Weaning trials:  yes  Respiratory Procedures:  no  Plan: Continue current ventilator settings and wean mechanical ventilation as tolerated per physician orders.   [phone]

## 2021-05-12 NOTE — DISCHARGE PLANNING
Patient discussed in IDT rounds. Thomas shared that he has made the decision that he wants patient to transfer to a [place] facility where he states there is an accepting neurosurgeon and the hospital is a trauma center.     Transfer center number given to family for accepting MD to call and request transfer.     Dr. Jordan aware and willing to conduct a peer to peer.     Plan: LSW to assist if needs arise with this possible acute to acute transfer.

## 2021-05-13 LAB
BACTERIA BRONCH AEROBE CULT: NORMAL
GRAM STN SPEC: NORMAL
SIGNIFICANT IND 70042: NORMAL
SITE SITE: NORMAL
SOURCE SOURCE: NORMAL
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
## 2021-05-13 NOTE — PROGRESS NOTES
Transfer center call to bedside RN to coordinate report with [facility] transport services. RN spoke to transport EMS, Dante, and report given. Per, [facility] they will be arriving in about 15 minutes.
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
## 2021-05-13 NOTE — PROGRESS NOTES
2055 [facility] transport at bedside. Report given. All appropriate documents and chart with patient. Copies of H/P, last progress note, discharge summary, most recent labs and imaging, COBRA and transfer paperwork handed off to transport team.   Dr. Jordan at bedside to help facilitate transport. Md reviewed all paperwork.     Vital signs stable with transport to [facility]. Patient transistioned to transport ventilator. Blood glucose 115.     Order for 50 mEq of KLYTE to be administered now. Bedside RN administered.     All belongings given to bel Thomas.     2128 Patient transported off unit with transport team    2130 Call to receiving facility to give report. Bedside RN gave report to Johnathon SCHAEFER. All questions answered at this time. Call back number given in case further questions arise.

## 2021-05-13 NOTE — PROGRESS NOTES
Attempted to call report to [facility] hospital. Per charge RN, please call back in 15 minutes and ask for Marizol. RN will attempt to call report again in 15 minutes.

## 2021-05-16 LAB
BACTERIA BLD CULT: NORMAL
BACTERIA BLD CULT: NORMAL
SIGNIFICANT IND 70042: NORMAL
SIGNIFICANT IND 70042: NORMAL
SITE SITE: NORMAL
SITE SITE: NORMAL
SOURCE SOURCE: NORMAL
SOURCE SOURCE: NORMAL